# Patient Record
Sex: FEMALE | Race: WHITE | NOT HISPANIC OR LATINO | Employment: FULL TIME | URBAN - METROPOLITAN AREA
[De-identification: names, ages, dates, MRNs, and addresses within clinical notes are randomized per-mention and may not be internally consistent; named-entity substitution may affect disease eponyms.]

---

## 2017-11-08 ENCOUNTER — TRANSCRIBE ORDERS (OUTPATIENT)
Dept: NUTRITION | Facility: HOSPITAL | Age: 27
End: 2017-11-08

## 2017-11-08 DIAGNOSIS — E66.3 OVERWEIGHT: Primary | ICD-10-CM

## 2018-01-26 ENCOUNTER — OFFICE VISIT (OUTPATIENT)
Dept: INTERNAL MEDICINE | Facility: CLINIC | Age: 28
End: 2018-01-26

## 2018-01-26 VITALS
OXYGEN SATURATION: 99 % | HEIGHT: 67 IN | SYSTOLIC BLOOD PRESSURE: 110 MMHG | TEMPERATURE: 98.3 F | BODY MASS INDEX: 30.39 KG/M2 | HEART RATE: 65 BPM | DIASTOLIC BLOOD PRESSURE: 70 MMHG | WEIGHT: 193.6 LBS

## 2018-01-26 DIAGNOSIS — E78.2 MIXED HYPERLIPIDEMIA: Primary | ICD-10-CM

## 2018-01-26 PROCEDURE — 99203 OFFICE O/P NEW LOW 30 MIN: CPT | Performed by: NURSE PRACTITIONER

## 2018-01-26 RX ORDER — DROSPIRENONE AND ETHINYL ESTRADIOL 0.03MG-3MG
1 KIT ORAL DAILY
COMMUNITY
Start: 2017-11-03 | End: 2020-02-13 | Stop reason: ALTCHOICE

## 2018-01-26 NOTE — PROGRESS NOTES
Subjective   Stephanie Gupta is a 27 y.o. female here to establish care.  Chief Complaint   Patient presents with   • Establish Care     New Patient.  Discuss cholesterol results. Discuss family hx of afib       History of Present Illness     Hyperlipidemia- noted on labs a the [facility] 1/3/18.  Total cholesterol 247, triglycerides 149, HDL 19, .  She does have a strong family history of hyperlipidemia maternal and paternal sides.  Diet:  Was eating a lot of processed foods and fast food. Has started cutting back over the last couple of weeks. Watching portions. Eating more grains, eats gluten free.  Exercise: Not currently     She has a family history of atrial fibrillation in her mother.  She tells me that she has noted some palpitations that last 2-3 seconds and occur every few months over the last few years.  Last occurrence was month or so ago.  She denies any chest pain or  shortness of breath or irregular heartbeat..  She does drink caffeine on a daily basis.    Health maintenance:   Influenza: declines  Tdap: 2006, Td: declined   Pap: 10/2017 per GYN  Tobacco use: denies      The following portions of the patient's history were reviewed and updated as appropriate: allergies, current medications, past family history, past medical history, past social history, past surgical history and problem list.    Review of Systems   Constitutional: Negative for appetite change, chills, fatigue and fever.   HENT: Negative for congestion, ear pain, rhinorrhea, sinus pressure and sore throat.    Eyes: Negative for itching and visual disturbance.   Respiratory: Negative for cough, chest tightness, shortness of breath and wheezing.    Cardiovascular: Positive for palpitations. Negative for chest pain and leg swelling.   Gastrointestinal: Negative for abdominal pain, constipation, diarrhea, nausea and vomiting.   Endocrine: Negative for cold intolerance and heat intolerance.   Genitourinary: Negative for difficulty  "urinating, dysuria and hematuria.   Musculoskeletal: Negative for arthralgias, back pain, joint swelling and myalgias.   Skin: Negative for rash and wound.   Allergic/Immunologic: Negative for environmental allergies and food allergies.   Neurological: Negative for dizziness, weakness, light-headedness, numbness and headaches.   Hematological: Negative for adenopathy. Does not bruise/bleed easily.   Psychiatric/Behavioral: Negative for sleep disturbance. The patient is not nervous/anxious.      Blood pressure 110/70, pulse 65, temperature 98.3 °F (36.8 °C), height 168.9 cm (66.5\"), weight 87.8 kg (193 lb 9.6 oz), SpO2 99 %.    Allergies   Allergen Reactions   • Codeine Anaphylaxis     Past Medical History:   Diagnosis Date   • Pap smear for cervical cancer screening 10/2017    normal per pt report     Past Surgical History:   Procedure Laterality Date   • KNEE SURGERY Right 2005?    sports related trauma    • WISDOM TOOTH EXTRACTION  2004?     Family History   Problem Relation Age of Onset   • Atrial fibrillation Mother      pacemaker   • Heart disease Maternal Uncle    • Heart attack Maternal Grandfather 65   • No Known Problems Father    • Anxiety disorder Sister    • Subarachnoid hemorrhage Maternal Grandmother      ANTICOAGULATION AND FALL   • Lung cancer Paternal Grandfather      Social History     Social History   • Marital status: Single     Spouse name: N/A   • Number of children: N/A   • Years of education: N/A     Occupational History   •       Social History Main Topics   • Smoking status: Never Smoker   • Smokeless tobacco: Never Used   • Alcohol use 1.2 oz/week     2 Glasses of wine per week   • Drug use: No   • Sexual activity: Yes     Partners: Male     Birth control/ protection: OCP     Other Topics Concern   • Not on file     Social History Narrative    LIVES WITH BOYFRIEND     Immunization History   Administered Date(s) Administered   • Tdap 01/01/2006       Current Outpatient " Prescriptions:   •  OCELLA 3-0.03 MG per tablet, Take 1 tablet by mouth Daily., Disp: , Rfl:     Objective   Physical Exam   Constitutional: She is oriented to person, place, and time. She appears well-developed and well-nourished. No distress.   HENT:   Head: Normocephalic and atraumatic.   Eyes: EOM are normal. Pupils are equal, round, and reactive to light.   Neck: No JVD present.   Cardiovascular: Normal rate, regular rhythm, normal heart sounds and intact distal pulses.  PMI is not displaced.    No murmur heard.  Pulmonary/Chest: Effort normal and breath sounds normal. No respiratory distress. She exhibits no tenderness.   Abdominal: Soft. Bowel sounds are normal. She exhibits no distension. There is no tenderness.   Musculoskeletal: She exhibits no edema.   Neurological: She is alert and oriented to person, place, and time.   Skin: Skin is warm and dry. She is not diaphoretic. No erythema. No pallor.   Psychiatric: She has a normal mood and affect. Her behavior is normal.   Nursing note and vitals reviewed.      Assessment/Plan   Stephanie was seen today for establish care.    Diagnoses and all orders for this visit:    Mixed hyperlipidemia        Outpatient Encounter Prescriptions as of 1/26/2018   Medication Sig Dispense Refill   • OCELLA 3-0.03 MG per tablet Take 1 tablet by mouth Daily.       No facility-administered encounter medications on file as of 1/26/2018.        Decrease caffeine.  Report further palpitations and keep a log of symptoms associated with.  May need cardiac workup, but I suspect suspect that these are benign.  She will work on weight loss through diet, portion control, and exercise.  She is going to try to get her boyfriend to work out with her 3 days a week.  We'll plan to repeat labs in about 3 months.  Return in about 3 months (around 4/26/2018) for Annual physical, no pap, fasting labs .       Plan of care discussed with pt. They verbalized understanding and agreement.

## 2018-06-01 ENCOUNTER — OFFICE VISIT (OUTPATIENT)
Dept: INTERNAL MEDICINE | Facility: CLINIC | Age: 28
End: 2018-06-01

## 2018-06-01 VITALS
HEART RATE: 60 BPM | TEMPERATURE: 98.5 F | OXYGEN SATURATION: 99 % | HEIGHT: 67 IN | SYSTOLIC BLOOD PRESSURE: 110 MMHG | WEIGHT: 191.2 LBS | BODY MASS INDEX: 30.01 KG/M2 | DIASTOLIC BLOOD PRESSURE: 70 MMHG

## 2018-06-01 DIAGNOSIS — Z00.00 ANNUAL PHYSICAL EXAM: Primary | ICD-10-CM

## 2018-06-01 DIAGNOSIS — E78.5 DYSLIPIDEMIA: ICD-10-CM

## 2018-06-01 DIAGNOSIS — E53.8 B12 DEFICIENCY: ICD-10-CM

## 2018-06-01 DIAGNOSIS — Z13.29 THYROID DISORDER SCREEN: ICD-10-CM

## 2018-06-01 DIAGNOSIS — E55.9 VITAMIN D DEFICIENCY: ICD-10-CM

## 2018-06-01 DIAGNOSIS — Z13.220 LIPID SCREENING: ICD-10-CM

## 2018-06-01 LAB
BILIRUB BLD-MCNC: NEGATIVE MG/DL
CLARITY, POC: CLEAR
COLOR UR: YELLOW
GLUCOSE UR STRIP-MCNC: NEGATIVE MG/DL
KETONES UR QL: NEGATIVE
LEUKOCYTE EST, POC: NEGATIVE
NITRITE UR-MCNC: NEGATIVE MG/ML
PH UR: 6 [PH] (ref 5–8)
PROT UR STRIP-MCNC: NEGATIVE MG/DL
RBC # UR STRIP: NEGATIVE /UL
SP GR UR: 1.02 (ref 1–1.03)
UROBILINOGEN UR QL: NORMAL

## 2018-06-01 PROCEDURE — 99395 PREV VISIT EST AGE 18-39: CPT | Performed by: NURSE PRACTITIONER

## 2018-06-01 PROCEDURE — 81003 URINALYSIS AUTO W/O SCOPE: CPT | Performed by: NURSE PRACTITIONER

## 2018-06-01 NOTE — PROGRESS NOTES
Patient Care Team:  WILSON Espinoza as PCP - General (Nurse Practitioner)     Chief complaint: Patient is in today for a physical          Patient is a 28 y.o. female who presents for her yearly physical exam.     HPI      Does not do SBE    Followed by GYN    Hyperlipidemia- noted on labs a the [facility] 1/3/18.  Total cholesterol 247, triglycerides 149, HDL 19, .  She does have a strong family history of hyperlipidemia maternal and paternal sides.       Health maintenance:   Influenza: declines  Tdap: 2006, Td:          Pap: 10/2017 per GYN  Tobacco use: denies    Eye exam: 12/2015  Dental exam: every 6 months    Diet: trying to do lean meats, veggies, cut back on butter, saturated fats, eating gluten free.She does drink caffeine on a daily basis.    Exercise: does not do much, did just sign up for beach body on demand.   Has more energy and wants to be more active.       Review of Systems   Constitutional: Negative for appetite change, chills, fatigue and fever.   HENT: Negative for congestion, ear pain, rhinorrhea, sinus pressure and sore throat.    Eyes: Negative for itching and visual disturbance.   Respiratory: Negative for cough, shortness of breath and wheezing.    Cardiovascular: Negative for chest pain, palpitations and leg swelling.   Gastrointestinal: Negative for abdominal pain, constipation, diarrhea, nausea and vomiting.   Endocrine: Negative for cold intolerance and heat intolerance.   Genitourinary: Negative for difficulty urinating, dysuria and hematuria.   Musculoskeletal: Negative for arthralgias, back pain, joint swelling and myalgias.   Skin: Negative for rash and wound.   Allergic/Immunologic: Negative for environmental allergies and food allergies.   Neurological: Negative for dizziness, numbness and headaches.   Hematological: Negative for adenopathy. Does not bruise/bleed easily.   Psychiatric/Behavioral: Negative for dysphoric mood and sleep disturbance. The patient  "is not nervous/anxious.              History  Past Medical History:   Diagnosis Date   • Pap smear for cervical cancer screening 10/2017    normal per pt report      Past Surgical History:   Procedure Laterality Date   • KNEE SURGERY Right 2005?    sports related trauma    • WISDOM TOOTH EXTRACTION  2004?      Allergies   Allergen Reactions   • Codeine Anaphylaxis      Family History   Problem Relation Age of Onset   • Atrial fibrillation Mother         pacemaker   • Heart disease Maternal Uncle    • Heart attack Maternal Grandfather 65   • No Known Problems Father    • Anxiety disorder Sister    • Subarachnoid hemorrhage Maternal Grandmother         ANTICOAGULATION AND FALL   • Lung cancer Paternal Grandfather      Social History     Social History   • Marital status: Single     Spouse name: N/A   • Number of children: N/A   • Years of education: N/A     Occupational History   •       Social History Main Topics   • Smoking status: Never Smoker   • Smokeless tobacco: Never Used   • Alcohol use 1.2 oz/week     2 Glasses of wine per week   • Drug use: No   • Sexual activity: Yes     Partners: Male     Birth control/ protection: OCP     Other Topics Concern   • Not on file     Social History Narrative    LIVES WITH BOYFRIEND        Current Outpatient Prescriptions:   •  OCELLA 3-0.03 MG per tablet, Take 1 tablet by mouth Daily., Disp: , Rfl:    Immunization History   Administered Date(s) Administered   • Tdap 01/01/2006                   /70   Pulse 60   Temp 98.5 °F (36.9 °C) (Temporal Artery )   Ht 168.9 cm (66.5\")   Wt 86.7 kg (191 lb 3.2 oz)   LMP 05/14/2018 (Approximate)   SpO2 99%   BMI 30.40 kg/m²       Physical Exam   Constitutional: She is oriented to person, place, and time. She appears well-developed and well-nourished. No distress.   HENT:   Head: Normocephalic and atraumatic.   Right Ear: External ear normal.   Left Ear: External ear normal.   Nose: Nose normal.   Mouth/Throat: " Oropharynx is clear and moist.   Eyes: Conjunctivae and EOM are normal. Pupils are equal, round, and reactive to light. Right eye exhibits no discharge. Left eye exhibits no discharge. No scleral icterus.   Neck: Normal range of motion. Neck supple. No JVD present. Carotid bruit is not present. No tracheal deviation present. No thyromegaly present.   Cardiovascular: Normal rate, regular rhythm, normal heart sounds and intact distal pulses.  PMI is not displaced.  Exam reveals no gallop and no friction rub.    No murmur heard.  Pulmonary/Chest: Effort normal and breath sounds normal. No respiratory distress. She has no wheezes. She has no rales. She exhibits no tenderness. Right breast exhibits no inverted nipple, no mass, no nipple discharge, no skin change and no tenderness. Left breast exhibits no inverted nipple, no mass, no nipple discharge, no skin change and no tenderness. Breasts are symmetrical. There is no breast swelling.   Abdominal: Soft. Normal appearance and bowel sounds are normal. She exhibits no distension and no mass. There is no hepatosplenomegaly. There is no tenderness. No hernia.   Genitourinary: No breast tenderness, discharge or bleeding.   Musculoskeletal: Normal range of motion. She exhibits no edema, tenderness or deformity.   Lymphadenopathy:        Head (right side): No submental, no submandibular, no tonsillar, no preauricular, no posterior auricular and no occipital adenopathy present.        Head (left side): No submental, no submandibular, no tonsillar, no preauricular, no posterior auricular and no occipital adenopathy present.     She has no cervical adenopathy.     She has no axillary adenopathy.        Right axillary: No pectoral and no lateral adenopathy present.        Left axillary: No pectoral and no lateral adenopathy present.  Neurological: She is alert and oriented to person, place, and time. She has normal reflexes. She displays normal reflexes.   Skin: Skin is warm and  dry. No rash noted. She is not diaphoretic. No erythema. No pallor.   Psychiatric: She has a normal mood and affect. Her behavior is normal. Thought content normal.   Nursing note and vitals reviewed.                Diagnoses and all orders for this visit:    Annual physical exam  -     POCT urinalysis dipstick, automated  -     CBC (No Diff)  -     Lipid Panel  -     TSH  -     Vitamin B12  -     Comprehensive Metabolic Panel  -     Vitamin D 25 Hydroxy    Vitamin D deficiency  -     Vitamin D 25 Hydroxy    B12 deficiency  -     Vitamin B12    Thyroid disorder screen  -     TSH    Lipid screening  -     Lipid Panel    BMI 30.0-30.9,adult  -     Lipid Panel    Dyslipidemia  -     Lipid Panel         Labs sent  Immunizations and screenings are: UTD  Counseling: diet, exercise, SBE   Follow up: Return in about 1 year (around 6/1/2019) for Annual physical.      Yamile Celestin, WILSON   6/1/2018   12:04 PM

## 2018-06-02 DIAGNOSIS — E55.9 VITAMIN D DEFICIENCY: Primary | ICD-10-CM

## 2018-06-02 PROBLEM — E78.00 ELEVATED LDL CHOLESTEROL LEVEL: Status: ACTIVE | Noted: 2018-06-02

## 2018-06-02 LAB
25(OH)D3+25(OH)D2 SERPL-MCNC: 8.2 NG/ML
ALBUMIN SERPL-MCNC: 4.2 G/DL (ref 3.2–4.8)
ALBUMIN/GLOB SERPL: 1.6 G/DL (ref 1.5–2.5)
ALP SERPL-CCNC: 98 U/L (ref 25–100)
ALT SERPL-CCNC: 29 U/L (ref 7–40)
AST SERPL-CCNC: 21 U/L (ref 0–33)
BILIRUB SERPL-MCNC: 0.4 MG/DL (ref 0.3–1.2)
BUN SERPL-MCNC: 10 MG/DL (ref 9–23)
BUN/CREAT SERPL: 12.5 (ref 7–25)
CALCIUM SERPL-MCNC: 8.9 MG/DL (ref 8.7–10.4)
CHLORIDE SERPL-SCNC: 106 MMOL/L (ref 99–109)
CHOLEST SERPL-MCNC: 194 MG/DL (ref 0–200)
CO2 SERPL-SCNC: 26 MMOL/L (ref 20–31)
CREAT SERPL-MCNC: 0.8 MG/DL (ref 0.6–1.3)
ERYTHROCYTE [DISTWIDTH] IN BLOOD BY AUTOMATED COUNT: 12.5 % (ref 11.3–14.5)
GFR SERPLBLD CREATININE-BSD FMLA CKD-EPI: 102 ML/MIN/1.73
GFR SERPLBLD CREATININE-BSD FMLA CKD-EPI: 84 ML/MIN/1.73
GLOBULIN SER CALC-MCNC: 2.7 GM/DL
GLUCOSE SERPL-MCNC: 90 MG/DL (ref 70–100)
HCT VFR BLD AUTO: 38.8 % (ref 34.5–44)
HDLC SERPL-MCNC: 55 MG/DL (ref 40–60)
HGB BLD-MCNC: 13 G/DL (ref 11.5–15.5)
LDLC SERPL CALC-MCNC: 122 MG/DL (ref 0–100)
MCH RBC QN AUTO: 29.8 PG (ref 27–31)
MCHC RBC AUTO-ENTMCNC: 33.5 G/DL (ref 32–36)
MCV RBC AUTO: 89 FL (ref 80–99)
PLATELET # BLD AUTO: 278 10*3/MM3 (ref 150–450)
POTASSIUM SERPL-SCNC: 4 MMOL/L (ref 3.5–5.5)
PROT SERPL-MCNC: 6.9 G/DL (ref 5.7–8.2)
RBC # BLD AUTO: 4.36 10*6/MM3 (ref 3.89–5.14)
SODIUM SERPL-SCNC: 140 MMOL/L (ref 132–146)
TRIGL SERPL-MCNC: 83 MG/DL (ref 0–150)
TSH SERPL DL<=0.005 MIU/L-ACNC: 1.34 MIU/ML (ref 0.35–5.35)
VIT B12 SERPL-MCNC: 434 PG/ML (ref 211–911)
VLDLC SERPL CALC-MCNC: 16.6 MG/DL
WBC # BLD AUTO: 6.88 10*3/MM3 (ref 3.5–10.8)

## 2018-06-02 RX ORDER — CHOLECALCIFEROL (VITAMIN D3) 1250 MCG
50000 CAPSULE ORAL
Qty: 8 CAPSULE | Refills: 0 | Status: SHIPPED | OUTPATIENT
Start: 2018-06-02 | End: 2019-03-18

## 2018-06-04 ENCOUNTER — TELEPHONE (OUTPATIENT)
Dept: INTERNAL MEDICINE | Facility: CLINIC | Age: 28
End: 2018-06-04

## 2018-06-04 NOTE — TELEPHONE ENCOUNTER
----- Message from WILSON Espinoza sent at 6/2/2018  8:08 AM EDT -----  Please let her know her labs look good except her vit D is low. I will send in vit D3 50,000 units for her to take weekly X 8 weeks. After that she can take vit D 4000 units daily.   Also she has a slight elevation in her LDL (bad cholesterol) work on diet and exercise a bit more to help with this, no need for meds right now

## 2019-03-18 ENCOUNTER — OFFICE VISIT (OUTPATIENT)
Dept: INTERNAL MEDICINE | Facility: CLINIC | Age: 29
End: 2019-03-18

## 2019-03-18 VITALS
HEIGHT: 67 IN | RESPIRATION RATE: 16 BRPM | DIASTOLIC BLOOD PRESSURE: 84 MMHG | OXYGEN SATURATION: 99 % | WEIGHT: 198 LBS | TEMPERATURE: 97.9 F | BODY MASS INDEX: 31.08 KG/M2 | SYSTOLIC BLOOD PRESSURE: 122 MMHG | HEART RATE: 87 BPM

## 2019-03-18 DIAGNOSIS — R10.9 FLANK PAIN: Primary | ICD-10-CM

## 2019-03-18 DIAGNOSIS — M54.42 ACUTE BILATERAL LOW BACK PAIN WITH LEFT-SIDED SCIATICA: ICD-10-CM

## 2019-03-18 LAB
BILIRUB BLD-MCNC: NEGATIVE MG/DL
CLARITY, POC: CLEAR
COLOR UR: YELLOW
GLUCOSE UR STRIP-MCNC: NEGATIVE MG/DL
KETONES UR QL: NEGATIVE
LEUKOCYTE EST, POC: NEGATIVE
NITRITE UR-MCNC: NEGATIVE MG/ML
PH UR: 6 [PH] (ref 5–8)
PROT UR STRIP-MCNC: NEGATIVE MG/DL
RBC # UR STRIP: NEGATIVE /UL
SP GR UR: 1.01 (ref 1–1.03)
UROBILINOGEN UR QL: NORMAL

## 2019-03-18 PROCEDURE — 96372 THER/PROPH/DIAG INJ SC/IM: CPT | Performed by: NURSE PRACTITIONER

## 2019-03-18 PROCEDURE — 81003 URINALYSIS AUTO W/O SCOPE: CPT | Performed by: NURSE PRACTITIONER

## 2019-03-18 PROCEDURE — 99214 OFFICE O/P EST MOD 30 MIN: CPT | Performed by: NURSE PRACTITIONER

## 2019-03-18 RX ORDER — CYCLOBENZAPRINE HCL 5 MG
5 TABLET ORAL 3 TIMES DAILY PRN
Qty: 15 TABLET | Refills: 0 | Status: SHIPPED | OUTPATIENT
Start: 2019-03-18 | End: 2020-02-13

## 2019-03-18 RX ORDER — DICLOFENAC SODIUM 75 MG/1
75 TABLET, DELAYED RELEASE ORAL 2 TIMES DAILY PRN
Qty: 60 TABLET | Refills: 0 | Status: SHIPPED | OUTPATIENT
Start: 2019-03-18 | End: 2020-02-13

## 2019-03-18 RX ORDER — KETOROLAC TROMETHAMINE 30 MG/ML
60 INJECTION, SOLUTION INTRAMUSCULAR; INTRAVENOUS ONCE
Status: COMPLETED | OUTPATIENT
Start: 2019-03-18 | End: 2019-03-18

## 2019-03-18 RX ADMIN — KETOROLAC TROMETHAMINE 60 MG: 30 INJECTION, SOLUTION INTRAMUSCULAR; INTRAVENOUS at 14:58

## 2019-03-18 NOTE — PATIENT INSTRUCTIONS
Back Exercises  If you have pain in your back, do these exercises 2-3 times each day or as told by your doctor. When the pain goes away, do the exercises once each day, but repeat the steps more times for each exercise (do more repetitions). If you do not have pain in your back, do these exercises once each day or as told by your doctor.  Exercises  Single Knee to Chest  Do these steps 3-5 times in a row for each le. Lie on your back on a firm bed or the floor with your legs stretched out.  2. Bring one knee to your chest.  3. Hold your knee to your chest by grabbing your knee or thigh.  4. Pull on your knee until you feel a gentle stretch in your lower back.  5. Keep doing the stretch for 10-30 seconds.  6. Slowly let go of your leg and straighten it.    Pelvic Tilt  Do these steps 5-10 times in a row:  1. Lie on your back on a firm bed or the floor with your legs stretched out.  2. Bend your knees so they point up to the ceiling. Your feet should be flat on the floor.  3. Tighten your lower belly (abdomen) muscles to press your lower back against the floor. This will make your tailbone point up to the ceiling instead of pointing down to your feet or the floor.  4. Stay in this position for 5-10 seconds while you gently tighten your muscles and breathe evenly.    Cat-Cow    Do these steps until your lower back bends more easily:  1. Get on your hands and knees on a firm surface. Keep your hands under your shoulders, and keep your knees under your hips. You may put padding under your knees.  2. Let your head hang down, and make your tailbone point down to the floor so your lower back is round like the back of a cat.  3. Stay in this position for 5 seconds.  4. Slowly lift your head and make your tailbone point up to the ceiling so your back hangs low (sags) like the back of a cow.  5. Stay in this position for 5 seconds.    Press-Ups    Do these steps 5-10 times in a row:  1. Lie on your belly (face-down) on  the floor.  2. Place your hands near your head, about shoulder-width apart.  3. While you keep your back relaxed and keep your hips on the floor, slowly straighten your arms to raise the top half of your body and lift your shoulders. Do not use your back muscles. To make yourself more comfortable, you may change where you place your hands.  4. Stay in this position for 5 seconds.  5. Slowly return to lying flat on the floor.    Bridges    Do these steps 10 times in a row:  1. Lie on your back on a firm surface.  2. Bend your knees so they point up to the ceiling. Your feet should be flat on the floor.  3. Tighten your butt muscles and lift your butt off of the floor until your waist is almost as high as your knees. If you do not feel the muscles working in your butt and the back of your thighs, slide your feet 1-2 inches farther away from your butt.  4. Stay in this position for 3-5 seconds.  5. Slowly lower your butt to the floor, and let your butt muscles relax.    If this exercise is too easy, try doing it with your arms crossed over your chest.  Belly Crunches  Do these steps 5-10 times in a row:  1. Lie on your back on a firm bed or the floor with your legs stretched out.  2. Bend your knees so they point up to the ceiling. Your feet should be flat on the floor.  3. Cross your arms over your chest.  4. Tip your chin a little bit toward your chest but do not bend your neck.  5. Tighten your belly muscles and slowly raise your chest just enough to lift your shoulder blades a tiny bit off of the floor.  6. Slowly lower your chest and your head to the floor.    Back Lifts  Do these steps 5-10 times in a row:  1. Lie on your belly (face-down) with your arms at your sides, and rest your forehead on the floor.  2. Tighten the muscles in your legs and your butt.  3. Slowly lift your chest off of the floor while you keep your hips on the floor. Keep the back of your head in line with the curve in your back. Look at the  floor while you do this.  4. Stay in this position for 3-5 seconds.  5. Slowly lower your chest and your face to the floor.    Contact a doctor if:  · Your back pain gets a lot worse when you do an exercise.  · Your back pain does not lessen 2 hours after you exercise.  If you have any of these problems, stop doing the exercises. Do not do them again unless your doctor says it is okay.  Get help right away if:  · You have sudden, very bad back pain. If this happens, stop doing the exercises. Do not do them again unless your doctor says it is okay.  This information is not intended to replace advice given to you by your health care provider. Make sure you discuss any questions you have with your health care provider.  Document Released: 01/20/2012 Document Revised: 07/25/2018 Document Reviewed: 02/11/2016  Elsevier Interactive Patient Education © 2019 [employer]

## 2019-03-18 NOTE — PROGRESS NOTES
Subjective   Stephanie Gupta is a 29 y.o. female.     Chief Complaint   Patient presents with   • Back Pain     left low back pain radiates to back of leg 6 weeks.  Tried icy hot patches.         Back Pain   This is a new problem. The current episode started more than 1 month ago. The problem occurs intermittently. The problem has been waxing and waning since onset. The pain is present in the lumbar spine. The quality of the pain is described as aching and shooting. The pain radiates to the left thigh and left knee (radiates to left posterior thish and anterior LE. ). The pain is at a severity of 9/10. The pain is the same all the time. The symptoms are aggravated by bending, lying down, position, sitting, twisting and standing. Associated symptoms include leg pain. Pertinent negatives include no abdominal pain, bladder incontinence, bowel incontinence, chest pain, dysuria, headaches, numbness, paresis, paresthesias, pelvic pain, perianal numbness, tingling, weakness or weight loss. Risk factors include obesity. She has tried NSAIDs (acet, icy hot) for the symptoms. The treatment provided mild relief.        The following portions of the patient's history were reviewed and updated as appropriate: allergies, current medications, past family history, past medical history, past social history, past surgical history and problem list.    Review of Systems   Constitutional: Negative.  Negative for weight loss.   Respiratory: Negative.    Cardiovascular: Negative for chest pain.   Gastrointestinal: Negative.  Negative for abdominal pain and bowel incontinence.   Genitourinary: Negative.  Negative for bladder incontinence, dysuria and pelvic pain.   Musculoskeletal: Positive for back pain. Negative for arthralgias, gait problem and myalgias.   Neurological: Negative for dizziness, tingling, tremors, weakness, numbness, headaches and paresthesias.       Outpatient Medications Marked as Taking for the 3/18/19 encounter (Office  Visit) with Yamile Celestin APRN   Medication Sig Dispense Refill   • Cholecalciferol (VITAMIN D3) 5000 units capsule capsule Take 5,000 Units by mouth Daily.     • OCELLA 3-0.03 MG per tablet Take 1 tablet by mouth Daily.           Objective   Physical Exam   Constitutional: She is oriented to person, place, and time. She appears well-developed and well-nourished.   HENT:   Head: Normocephalic and atraumatic.   Eyes: Conjunctivae and EOM are normal. Pupils are equal, round, and reactive to light.   Neck: Normal range of motion.   Cardiovascular: Normal rate, regular rhythm and normal heart sounds.   Pulmonary/Chest: Effort normal and breath sounds normal.   Abdominal: Soft. Bowel sounds are normal.   Musculoskeletal:        Lumbar back: She exhibits decreased range of motion, tenderness and pain.   Neurological: She is alert and oriented to person, place, and time. She has normal strength and normal reflexes. No cranial nerve deficit or sensory deficit.   Skin: Skin is warm and dry.   Psychiatric: She has a normal mood and affect. Her behavior is normal. Judgment and thought content normal.   Nursing note and vitals reviewed.      Vitals:    03/18/19 1424   BP: 122/84   Pulse: 87   Resp: 16   Temp: 97.9 °F (36.6 °C)   SpO2: 99%     Brief Urine Lab Results  (Last result in the past 365 days)      Color   Clarity   Blood   Leuk Est   Nitrite   Protein   CREAT   Urine HCG        03/18/19 1442 Yellow Clear Negative Negative Negative Negative               Assessment/Plan   Stephanie was seen today for back pain.    Diagnoses and all orders for this visit:    Flank pain  -     POCT urinalysis dipstick, automated  -     ketorolac (TORADOL) injection 60 mg    Acute bilateral low back pain with left-sided sciatica  -     diclofenac (VOLTAREN) 75 MG EC tablet; Take 1 tablet by mouth 2 (Two) Times a Day As Needed (pain).  -     cyclobenzaprine (FLEXERIL) 5 MG tablet; Take 1 tablet by mouth 3 (Three) Times a Day As Needed for  Muscle Spasms.        toradol injection in office.   Diclofenac- start tomorrow.   Flexeril PRN  AE's discussed.    pt would like to avoid steroid and narcotic medications ifpossible.   Warm compress or heating pad to affected area, prn x 20 minutes with barrier between heat source and skin  Return if symptoms worsen or fail to improve.  Plan of care discussed with pt. They verbalized understanding and agreement.

## 2019-12-13 ENCOUNTER — TRANSCRIBE ORDERS (OUTPATIENT)
Dept: LAB | Facility: HOSPITAL | Age: 29
End: 2019-12-13

## 2019-12-13 ENCOUNTER — LAB (OUTPATIENT)
Dept: LAB | Facility: HOSPITAL | Age: 29
End: 2019-12-13

## 2019-12-13 DIAGNOSIS — Z01.419 ROUTINE GYNECOLOGICAL EXAMINATION: ICD-10-CM

## 2019-12-13 DIAGNOSIS — Z01.419 ROUTINE GYNECOLOGICAL EXAMINATION: Primary | ICD-10-CM

## 2019-12-13 LAB
25(OH)D3 SERPL-MCNC: 33.9 NG/ML (ref 30–100)
CHOLEST SERPL-MCNC: 192 MG/DL (ref 0–200)
DEPRECATED RDW RBC AUTO: 38.9 FL (ref 37–54)
ERYTHROCYTE [DISTWIDTH] IN BLOOD BY AUTOMATED COUNT: 11.8 % (ref 12.3–15.4)
GLUCOSE BLD-MCNC: 94 MG/DL (ref 65–99)
HCT VFR BLD AUTO: 37.3 % (ref 34–46.6)
HDLC SERPL-MCNC: 59 MG/DL (ref 40–60)
HGB BLD-MCNC: 12.9 G/DL (ref 12–15.9)
LDLC SERPL CALC-MCNC: 116 MG/DL (ref 0–100)
LDLC/HDLC SERPL: 1.97 {RATIO}
MCH RBC QN AUTO: 31.4 PG (ref 26.6–33)
MCHC RBC AUTO-ENTMCNC: 34.6 G/DL (ref 31.5–35.7)
MCV RBC AUTO: 90.8 FL (ref 79–97)
PLATELET # BLD AUTO: 303 10*3/MM3 (ref 140–450)
PMV BLD AUTO: 11.9 FL (ref 6–12)
RBC # BLD AUTO: 4.11 10*6/MM3 (ref 3.77–5.28)
TRIGL SERPL-MCNC: 84 MG/DL (ref 0–150)
TSH SERPL DL<=0.05 MIU/L-ACNC: 1.47 UIU/ML (ref 0.27–4.2)
VLDLC SERPL-MCNC: 16.8 MG/DL (ref 5–40)
WBC NRBC COR # BLD: 7.16 10*3/MM3 (ref 3.4–10.8)

## 2019-12-13 PROCEDURE — 36415 COLL VENOUS BLD VENIPUNCTURE: CPT

## 2019-12-13 PROCEDURE — 82947 ASSAY GLUCOSE BLOOD QUANT: CPT

## 2019-12-13 PROCEDURE — 85027 COMPLETE CBC AUTOMATED: CPT

## 2019-12-13 PROCEDURE — 84443 ASSAY THYROID STIM HORMONE: CPT

## 2019-12-13 PROCEDURE — 80061 LIPID PANEL: CPT

## 2019-12-13 PROCEDURE — 82306 VITAMIN D 25 HYDROXY: CPT

## 2020-02-13 ENCOUNTER — OFFICE VISIT (OUTPATIENT)
Dept: RETAIL CLINIC | Facility: CLINIC | Age: 30
End: 2020-02-13

## 2020-02-13 VITALS
DIASTOLIC BLOOD PRESSURE: 78 MMHG | WEIGHT: 202 LBS | RESPIRATION RATE: 16 BRPM | OXYGEN SATURATION: 98 % | HEART RATE: 114 BPM | TEMPERATURE: 98.5 F | HEIGHT: 67 IN | BODY MASS INDEX: 31.71 KG/M2 | SYSTOLIC BLOOD PRESSURE: 106 MMHG

## 2020-02-13 DIAGNOSIS — J10.1 INFLUENZA B: Primary | ICD-10-CM

## 2020-02-13 LAB
EXPIRATION DATE: ABNORMAL
FLUAV AG NPH QL: NEGATIVE
FLUBV AG NPH QL: POSITIVE
INTERNAL CONTROL: ABNORMAL
Lab: ABNORMAL

## 2020-02-13 PROCEDURE — 87804 INFLUENZA ASSAY W/OPTIC: CPT | Performed by: NURSE PRACTITIONER

## 2020-02-13 PROCEDURE — 99213 OFFICE O/P EST LOW 20 MIN: CPT | Performed by: NURSE PRACTITIONER

## 2020-02-13 RX ORDER — ETONOGESTREL AND ETHINYL ESTRADIOL 11.7; 2.7 MG/1; MG/1
1 INSERT, EXTENDED RELEASE VAGINAL
COMMUNITY
End: 2021-01-09 | Stop reason: HOSPADM

## 2020-02-13 RX ORDER — BROMPHENIRAMINE MALEATE, PSEUDOEPHEDRINE HYDROCHLORIDE, AND DEXTROMETHORPHAN HYDROBROMIDE 2; 30; 10 MG/5ML; MG/5ML; MG/5ML
5 SYRUP ORAL 4 TIMES DAILY PRN
Qty: 120 ML | Refills: 0 | Status: SHIPPED | OUTPATIENT
Start: 2020-02-13 | End: 2021-01-09 | Stop reason: HOSPADM

## 2020-02-13 RX ORDER — OSELTAMIVIR PHOSPHATE 75 MG/1
75 CAPSULE ORAL 2 TIMES DAILY
Qty: 10 CAPSULE | Refills: 0 | Status: SHIPPED | OUTPATIENT
Start: 2020-02-13 | End: 2020-02-18

## 2020-02-13 NOTE — PATIENT INSTRUCTIONS
"Influenza, Adult  Influenza is also called \"the flu.\" It is an infection in the lungs, nose, and throat (respiratory tract). It is caused by a virus. The flu causes symptoms that are similar to symptoms of a cold. It also causes a high fever and body aches.  The flu spreads easily from person to person (is contagious). Getting a flu shot (influenza vaccination) every year is the best way to prevent the flu.  What are the causes?  This condition is caused by the influenza virus. You can get the virus by:  · Breathing in droplets that are in the air from the cough or sneeze of a person who has the virus.  · Touching something that has the virus on it (is contaminated) and then touching your mouth, nose, or eyes.  What increases the risk?  Certain things may make you more likely to get the flu. These include:  · Not washing your hands often.  · Having close contact with many people during cold and flu season.  · Touching your mouth, eyes, or nose without first washing your hands.  · Not getting a flu shot every year.  You may have a higher risk for the flu, along with serious problems such as a lung infection (pneumonia), if you:  · Are older than 65.  · Are pregnant.  · Have a weakened disease-fighting system (immune system) because of a disease or taking certain medicines.  · Have a long-term (chronic) illness, such as:  ? Heart, kidney, or lung disease.  ? Diabetes.  ? Asthma.  · Have a liver disorder.  · Are very overweight (morbidly obese).  · Have anemia. This is a condition that affects your red blood cells.  What are the signs or symptoms?  Symptoms usually begin suddenly and last 4-14 days. They may include:  · Fever and chills.  · Headaches, body aches, or muscle aches.  · Sore throat.  · Cough.  · Runny or stuffy (congested) nose.  · Chest discomfort.  · Not wanting to eat as much as normal (poor appetite).  · Weakness or feeling tired (fatigue).  · Dizziness.  · Feeling sick to your stomach (nauseous) or " throwing up (vomiting).  How is this treated?  If the flu is found early, you can be treated with medicine that can help reduce how bad the illness is and how long it lasts (antiviral medicine). This may be given by mouth (orally) or through an IV tube.  Taking care of yourself at home can help your symptoms get better. Your doctor may suggest:  · Taking over-the-counter medicines.  · Drinking plenty of fluids.  The flu often goes away on its own. If you have very bad symptoms or other problems, you may be treated in a hospital.  Follow these instructions at home:         Activity  · Rest as needed. Get plenty of sleep.  · Stay home from work or school as told by your doctor.  ? Do not leave home until you do not have a fever for 24 hours without taking medicine.  ? Leave home only to visit your doctor.  Eating and drinking  · Take an ORS (oral rehydration solution). This is a drink that is sold at pharmacies and stores.  · Drink enough fluid to keep your pee (urine) pale yellow.  · Drink clear fluids in small amounts as you are able. Clear fluids include:  ? Water.  ? Ice chips.  ? Fruit juice that has water added (diluted fruit juice).  ? Low-calorie sports drinks.  · Eat bland, easy-to-digest foods in small amounts as you are able. These foods include:  ? Bananas.  ? Applesauce.  ? Rice.  ? Lean meats.  ? Toast.  ? Crackers.  · Do not eat or drink:  ? Fluids that have a lot of sugar or caffeine.  ? Alcohol.  ? Spicy or fatty foods.  General instructions  · Take over-the-counter and prescription medicines only as told by your doctor.  · Use a cool mist humidifier to add moisture to the air in your home. This can make it easier for you to breathe.  · Cover your mouth and nose when you cough or sneeze.  · Wash your hands with soap and water often, especially after you cough or sneeze. If you cannot use soap and water, use alcohol-based hand .  · Keep all follow-up visits as told by your doctor. This is  "important.  How is this prevented?    · Get a flu shot every year. You may get the flu shot in late summer, fall, or winter. Ask your doctor when you should get your flu shot.  · Avoid contact with people who are sick during fall and winter (cold and flu season).  Contact a doctor if:  · You get new symptoms.  · You have:  ? Chest pain.  ? Watery poop (diarrhea).  ? A fever.  · Your cough gets worse.  · You start to have more mucus.  · You feel sick to your stomach.  · You throw up.  Get help right away if you:  · Have shortness of breath.  · Have trouble breathing.  · Have skin or nails that turn a bluish color.  · Have very bad pain or stiffness in your neck.  · Get a sudden headache.  · Get sudden pain in your face or ear.  · Cannot eat or drink without throwing up.  Summary  · Influenza (\"the flu\") is an infection in the lungs, nose, and throat. It is caused by a virus.  · Take over-the-counter and prescription medicines only as told by your doctor.  · Getting a flu shot every year is the best way to avoid getting the flu.  This information is not intended to replace advice given to you by your health care provider. Make sure you discuss any questions you have with your health care provider.  Document Released: 09/26/2009 Document Revised: 06/05/2019 Document Reviewed: 06/05/2019  [employer] Patient Education © 2019 [employer]    "

## 2020-02-13 NOTE — PROGRESS NOTES
"Subjective   Stephanie Gupta is a 30 y.o. female.   Chief Complaint   Patient presents with   • Flu Symptoms      29 yo w/f presents with complaint of \"flu symptoms\" duration of 2 days, fever (102), runny nose alternating with  nasal congestion, occasional cough, body aches, chills, duration of 2 days.  She has taken therflu with some relief.  Refer to HPI/ROS for additional information.    Flu Symptoms   This is a new problem. Episode onset: 2 days. The problem has been gradually worsening. Associated symptoms include chills, congestion, coughing, a fever, headaches and myalgias. The symptoms are aggravated by exertion. She has tried rest (Theraflu) for the symptoms. The treatment provided mild relief.        The following portions of the patient's history were reviewed and updated as appropriate: allergies, current medications, past family history, past medical history, past social history, past surgical history and problem list.    Current Outpatient Medications:   •  etonogestrel-ethinyl estradiol (NUVARING) 0.12-0.015 MG/24HR vaginal ring, Insert 1 each into the vagina Every 28 (Twenty-Eight) Days. Insert vaginally and leave in place for 3 consecutive weeks, then remove for 1 week., Disp: , Rfl:   •  brompheniramine-pseudoephedrine-DM 30-2-10 MG/5ML syrup, Take 5 mL by mouth 4 (Four) Times a Day As Needed for Congestion or Cough., Disp: 120 mL, Rfl: 0  •  oseltamivir (TAMIFLU) 75 MG capsule, Take 1 capsule by mouth 2 (Two) Times a Day for 5 days., Disp: 10 capsule, Rfl: 0    Review of Systems   Constitutional: Positive for activity change, appetite change, chills and fever.   HENT: Positive for congestion, postnasal drip, rhinorrhea and sneezing. Negative for ear discharge, ear pain and trouble swallowing.    Eyes: Negative.    Respiratory: Positive for cough. Negative for apnea, choking, chest tightness, shortness of breath, wheezing and stridor.    Cardiovascular: Negative.    Gastrointestinal: Negative.  " "  Musculoskeletal: Positive for myalgias.   Skin: Negative.    Neurological: Positive for headaches.   Psychiatric/Behavioral: Negative.      /78 (BP Location: Left arm, Patient Position: Sitting)   Pulse 114   Temp 98.5 °F (36.9 °C) (Oral)   Resp 16   Ht 170.2 cm (67\")   Wt 91.6 kg (202 lb)   SpO2 98%   BMI 31.64 kg/m²     Objective   Allergies   Allergen Reactions   • Codeine Anaphylaxis       Physical Exam   Constitutional: She is oriented to person, place, and time. Vital signs are normal. She appears well-developed and well-nourished. She is active and cooperative. She appears ill. No distress.   HENT:   Head: Normocephalic.   Right Ear: Hearing, tympanic membrane, external ear and ear canal normal.   Left Ear: Hearing, tympanic membrane, external ear and ear canal normal.   Nose: Mucosal edema and rhinorrhea present. No sinus tenderness. Right sinus exhibits no maxillary sinus tenderness and no frontal sinus tenderness. Left sinus exhibits no maxillary sinus tenderness and no frontal sinus tenderness.   Mouth/Throat: Uvula is midline and mucous membranes are normal. No oropharyngeal exudate. Tonsils are 0 on the right. Tonsils are 0 on the left. No tonsillar exudate.   Postnasal drip noted   Eyes: Conjunctivae are normal.   Neck: Normal range of motion. Neck supple. No JVD present. No tracheal deviation present. No thyromegaly present.   Cardiovascular: Normal rate, regular rhythm and normal heart sounds. Exam reveals no gallop and no friction rub.   No murmur heard.  Pulmonary/Chest: Effort normal and breath sounds normal. No stridor. No respiratory distress. She has no wheezes. She has no rales.   Neurological: She is alert and oriented to person, place, and time.   Skin: Skin is warm and dry. Capillary refill takes less than 2 seconds. She is not diaphoretic.   Psychiatric: She has a normal mood and affect. Her behavior is normal.   Vitals reviewed.      Assessment/Plan   Stephanie was seen today " for flu symptoms.    Diagnoses and all orders for this visit:    Influenza B  -     POC Influenza A / B    Other orders  -     oseltamivir (TAMIFLU) 75 MG capsule; Take 1 capsule by mouth 2 (Two) Times a Day for 5 days.  -     brompheniramine-pseudoephedrine-DM 30-2-10 MG/5ML syrup; Take 5 mL by mouth 4 (Four) Times a Day As Needed for Congestion or Cough.      Results for orders placed or performed in visit on 02/13/20   POC Influenza A / B   Result Value Ref Range    Rapid Influenza A Ag Negative Negative    Rapid Influenza B Ag Positive (A) Negative    Internal Control Passed Passed    Lot Number [id number]     Expiration Date 05/06/2022           An After Visit Summary was printed, reviewed, and given to the patient. Understanding verbalized and agrees with treatment plan.  If no improvement or becomes worse, follow up with primary or go to [facility]/ER.          February 13, 2020 10:02 AM

## 2020-06-02 ENCOUNTER — LAB (OUTPATIENT)
Dept: LAB | Facility: HOSPITAL | Age: 30
End: 2020-06-02

## 2020-06-02 ENCOUNTER — TRANSCRIBE ORDERS (OUTPATIENT)
Dept: LAB | Facility: HOSPITAL | Age: 30
End: 2020-06-02

## 2020-06-02 DIAGNOSIS — Z3A.08 8 WEEKS GESTATION OF PREGNANCY: Primary | ICD-10-CM

## 2020-06-02 DIAGNOSIS — Z3A.08 8 WEEKS GESTATION OF PREGNANCY: ICD-10-CM

## 2020-06-02 DIAGNOSIS — Z34.81 PRENATAL CARE, SUBSEQUENT PREGNANCY, FIRST TRIMESTER: ICD-10-CM

## 2020-06-02 LAB
ABO GROUP BLD: NORMAL
AMPHET+METHAMPHET UR QL: NEGATIVE
AMPHETAMINES UR QL: NEGATIVE
BARBITURATES UR QL SCN: NEGATIVE
BASOPHILS # BLD AUTO: 0.04 10*3/MM3 (ref 0–0.2)
BASOPHILS NFR BLD AUTO: 0.5 % (ref 0–1.5)
BENZODIAZ UR QL SCN: NEGATIVE
BILIRUB UR QL STRIP: NEGATIVE
BLD GP AB SCN SERPL QL: NEGATIVE
BUPRENORPHINE SERPL-MCNC: NEGATIVE NG/ML
CANNABINOIDS SERPL QL: NEGATIVE
CLARITY UR: CLEAR
COCAINE UR QL: NEGATIVE
COLOR UR: YELLOW
DEPRECATED RDW RBC AUTO: 41.2 FL (ref 37–54)
EOSINOPHIL # BLD AUTO: 0.02 10*3/MM3 (ref 0–0.4)
EOSINOPHIL NFR BLD AUTO: 0.2 % (ref 0.3–6.2)
ERYTHROCYTE [DISTWIDTH] IN BLOOD BY AUTOMATED COUNT: 12.6 % (ref 12.3–15.4)
GLUCOSE BLD-MCNC: 84 MG/DL (ref 65–99)
GLUCOSE UR STRIP-MCNC: NEGATIVE MG/DL
HBV SURFACE AG SERPL QL IA: NORMAL
HCT VFR BLD AUTO: 38 % (ref 34–46.6)
HCV AB SER DONR QL: NORMAL
HGB BLD-MCNC: 13.3 G/DL (ref 12–15.9)
HGB UR QL STRIP.AUTO: NEGATIVE
HIV1+2 AB SER QL: NORMAL
IMM GRANULOCYTES # BLD AUTO: 0.04 10*3/MM3 (ref 0–0.05)
IMM GRANULOCYTES NFR BLD AUTO: 0.5 % (ref 0–0.5)
KETONES UR QL STRIP: NEGATIVE
LEUKOCYTE ESTERASE UR QL STRIP.AUTO: NEGATIVE
LYMPHOCYTES # BLD AUTO: 1.92 10*3/MM3 (ref 0.7–3.1)
LYMPHOCYTES NFR BLD AUTO: 23 % (ref 19.6–45.3)
MCH RBC QN AUTO: 31.4 PG (ref 26.6–33)
MCHC RBC AUTO-ENTMCNC: 35 G/DL (ref 31.5–35.7)
MCV RBC AUTO: 89.8 FL (ref 79–97)
METHADONE UR QL SCN: NEGATIVE
MONOCYTES # BLD AUTO: 0.5 10*3/MM3 (ref 0.1–0.9)
MONOCYTES NFR BLD AUTO: 6 % (ref 5–12)
NEUTROPHILS # BLD AUTO: 5.83 10*3/MM3 (ref 1.7–7)
NEUTROPHILS NFR BLD AUTO: 69.8 % (ref 42.7–76)
NITRITE UR QL STRIP: NEGATIVE
NRBC BLD AUTO-RTO: 0 /100 WBC (ref 0–0.2)
OPIATES UR QL: NEGATIVE
OXYCODONE UR QL SCN: NEGATIVE
PCP UR QL SCN: NEGATIVE
PH UR STRIP.AUTO: 7 [PH] (ref 5–8)
PLATELET # BLD AUTO: 268 10*3/MM3 (ref 140–450)
PMV BLD AUTO: 12.7 FL (ref 6–12)
PROPOXYPH UR QL: NEGATIVE
PROT UR QL STRIP: NEGATIVE
RBC # BLD AUTO: 4.23 10*6/MM3 (ref 3.77–5.28)
RH BLD: POSITIVE
SP GR UR STRIP: 1.01 (ref 1–1.03)
TRICYCLICS UR QL SCN: NEGATIVE
UROBILINOGEN UR QL STRIP: NORMAL
WBC NRBC COR # BLD: 8.35 10*3/MM3 (ref 3.4–10.8)

## 2020-06-02 PROCEDURE — 86803 HEPATITIS C AB TEST: CPT

## 2020-06-02 PROCEDURE — 82947 ASSAY GLUCOSE BLOOD QUANT: CPT

## 2020-06-02 PROCEDURE — 80081 OBSTETRIC PANEL INC HIV TSTG: CPT

## 2020-06-02 PROCEDURE — 36415 COLL VENOUS BLD VENIPUNCTURE: CPT

## 2020-06-02 PROCEDURE — 80306 DRUG TEST PRSMV INSTRMNT: CPT

## 2020-06-02 PROCEDURE — 81003 URINALYSIS AUTO W/O SCOPE: CPT

## 2020-06-03 LAB
HOLD SPECIMEN: NORMAL
RPR SER QL: NORMAL
RUBV IGG SERPL IA-ACNC: NEGATIVE

## 2020-10-23 ENCOUNTER — LAB (OUTPATIENT)
Dept: LAB | Facility: HOSPITAL | Age: 30
End: 2020-10-23

## 2020-10-23 ENCOUNTER — TRANSCRIBE ORDERS (OUTPATIENT)
Dept: LAB | Facility: HOSPITAL | Age: 30
End: 2020-10-23

## 2020-10-23 DIAGNOSIS — Z3A.28 28 WEEKS GESTATION OF PREGNANCY: Primary | ICD-10-CM

## 2020-10-23 DIAGNOSIS — Z34.83 PRENATAL CARE, SUBSEQUENT PREGNANCY, THIRD TRIMESTER: ICD-10-CM

## 2020-10-23 DIAGNOSIS — Z3A.28 28 WEEKS GESTATION OF PREGNANCY: ICD-10-CM

## 2020-10-23 LAB
BLD GP AB SCN SERPL QL: NEGATIVE
DEPRECATED RDW RBC AUTO: 40.5 FL (ref 37–54)
ERYTHROCYTE [DISTWIDTH] IN BLOOD BY AUTOMATED COUNT: 12 % (ref 12.3–15.4)
EXTERNAL GTT 1 HOUR: 158
GLUCOSE 1H P 100 G GLC PO SERPL-MCNC: 158 MG/DL (ref 65–140)
HCT VFR BLD AUTO: 37.8 % (ref 34–46.6)
HGB BLD-MCNC: 12.7 G/DL (ref 12–15.9)
MCH RBC QN AUTO: 30.6 PG (ref 26.6–33)
MCHC RBC AUTO-ENTMCNC: 33.6 G/DL (ref 31.5–35.7)
MCV RBC AUTO: 91.1 FL (ref 79–97)
PLATELET # BLD AUTO: 225 10*3/MM3 (ref 140–450)
PMV BLD AUTO: 12.9 FL (ref 6–12)
RBC # BLD AUTO: 4.15 10*6/MM3 (ref 3.77–5.28)
WBC # BLD AUTO: 9.62 10*3/MM3 (ref 3.4–10.8)

## 2020-10-23 PROCEDURE — 36415 COLL VENOUS BLD VENIPUNCTURE: CPT

## 2020-10-23 PROCEDURE — 82950 GLUCOSE TEST: CPT

## 2020-10-23 PROCEDURE — 85027 COMPLETE CBC AUTOMATED: CPT

## 2020-10-23 PROCEDURE — 86850 RBC ANTIBODY SCREEN: CPT

## 2020-10-26 ENCOUNTER — LAB (OUTPATIENT)
Dept: LAB | Facility: HOSPITAL | Age: 30
End: 2020-10-26

## 2020-10-26 ENCOUNTER — TRANSCRIBE ORDERS (OUTPATIENT)
Dept: LAB | Facility: HOSPITAL | Age: 30
End: 2020-10-26

## 2020-10-26 DIAGNOSIS — Z34.83 PRENATAL CARE, SUBSEQUENT PREGNANCY, THIRD TRIMESTER: Primary | ICD-10-CM

## 2020-10-26 DIAGNOSIS — Z34.83 PRENATAL CARE, SUBSEQUENT PREGNANCY, THIRD TRIMESTER: ICD-10-CM

## 2020-10-26 LAB
EXTERNAL GTT 3 HOURS: NORMAL
GLUCOSE 1H P 100 G GLC PO SERPL-MCNC: 167 MG/DL (ref 74–180)
GLUCOSE 2H P 100 G GLC PO SERPL-MCNC: 152 MG/DL (ref 74–155)
GLUCOSE 3H P 100 G GLC PO SERPL-MCNC: 130 MG/DL (ref 74–140)
GLUCOSE P FAST SERPL-MCNC: 80 MG/DL (ref 74–106)

## 2020-10-26 PROCEDURE — 82952 GTT-ADDED SAMPLES: CPT

## 2020-10-26 PROCEDURE — 82951 GLUCOSE TOLERANCE TEST (GTT): CPT

## 2020-10-26 PROCEDURE — 36415 COLL VENOUS BLD VENIPUNCTURE: CPT

## 2020-12-10 LAB — EXTERNAL GROUP B STREP ANTIGEN: NEGATIVE

## 2021-01-06 ENCOUNTER — HOSPITAL ENCOUNTER (INPATIENT)
Facility: HOSPITAL | Age: 31
LOS: 3 days | Discharge: HOME | End: 2021-01-09
Attending: OBSTETRICS & GYNECOLOGY | Admitting: OBSTETRICS & GYNECOLOGY

## 2021-01-06 DIAGNOSIS — E66.01 MORBIDLY OBESE (HCC): ICD-10-CM

## 2021-01-06 PROBLEM — Z3A.40 40 WEEKS GESTATION OF PREGNANCY: Status: ACTIVE | Noted: 2021-01-06

## 2021-01-06 LAB
ABO GROUP BLD: NORMAL
ALP SERPL-CCNC: 198 U/L (ref 39–117)
ALT SERPL W P-5'-P-CCNC: 12 U/L (ref 1–33)
AST SERPL-CCNC: 16 U/L (ref 1–32)
BILIRUB SERPL-MCNC: 0.2 MG/DL (ref 0–1.2)
BLD GP AB SCN SERPL QL: NEGATIVE
CREAT SERPL-MCNC: 0.49 MG/DL (ref 0.57–1)
DEPRECATED RDW RBC AUTO: 44.6 FL (ref 37–54)
ERYTHROCYTE [DISTWIDTH] IN BLOOD BY AUTOMATED COUNT: 13.2 % (ref 12.3–15.4)
HCT VFR BLD AUTO: 36.2 % (ref 34–46.6)
HGB BLD-MCNC: 12 G/DL (ref 12–15.9)
LDH SERPL-CCNC: 303 U/L (ref 135–214)
MCH RBC QN AUTO: 30.5 PG (ref 26.6–33)
MCHC RBC AUTO-ENTMCNC: 33.1 G/DL (ref 31.5–35.7)
MCV RBC AUTO: 91.9 FL (ref 79–97)
PLATELET # BLD AUTO: 190 10*3/MM3 (ref 140–450)
PMV BLD AUTO: 13.1 FL (ref 6–12)
RBC # BLD AUTO: 3.94 10*6/MM3 (ref 3.77–5.28)
RH BLD: POSITIVE
SARS-COV-2 RDRP RESP QL NAA+PROBE: NORMAL
T&S EXPIRATION DATE: NORMAL
URATE SERPL-MCNC: 3.7 MG/DL (ref 2.4–5.7)
WBC # BLD AUTO: 8.01 10*3/MM3 (ref 3.4–10.8)

## 2021-01-06 PROCEDURE — 82247 BILIRUBIN TOTAL: CPT | Performed by: ADVANCED PRACTICE MIDWIFE

## 2021-01-06 PROCEDURE — 86850 RBC ANTIBODY SCREEN: CPT | Performed by: ADVANCED PRACTICE MIDWIFE

## 2021-01-06 PROCEDURE — 84075 ASSAY ALKALINE PHOSPHATASE: CPT | Performed by: ADVANCED PRACTICE MIDWIFE

## 2021-01-06 PROCEDURE — 84450 TRANSFERASE (AST) (SGOT): CPT | Performed by: ADVANCED PRACTICE MIDWIFE

## 2021-01-06 PROCEDURE — 82565 ASSAY OF CREATININE: CPT | Performed by: ADVANCED PRACTICE MIDWIFE

## 2021-01-06 PROCEDURE — 84460 ALANINE AMINO (ALT) (SGPT): CPT | Performed by: ADVANCED PRACTICE MIDWIFE

## 2021-01-06 PROCEDURE — 86900 BLOOD TYPING SEROLOGIC ABO: CPT | Performed by: ADVANCED PRACTICE MIDWIFE

## 2021-01-06 PROCEDURE — 83615 LACTATE (LD) (LDH) ENZYME: CPT | Performed by: ADVANCED PRACTICE MIDWIFE

## 2021-01-06 PROCEDURE — 87635 SARS-COV-2 COVID-19 AMP PRB: CPT | Performed by: ADVANCED PRACTICE MIDWIFE

## 2021-01-06 PROCEDURE — 84550 ASSAY OF BLOOD/URIC ACID: CPT | Performed by: ADVANCED PRACTICE MIDWIFE

## 2021-01-06 PROCEDURE — 86901 BLOOD TYPING SEROLOGIC RH(D): CPT | Performed by: ADVANCED PRACTICE MIDWIFE

## 2021-01-06 PROCEDURE — 85027 COMPLETE CBC AUTOMATED: CPT | Performed by: ADVANCED PRACTICE MIDWIFE

## 2021-01-06 RX ORDER — FAMOTIDINE 20 MG/1
20 TABLET, FILM COATED ORAL EVERY 12 HOURS SCHEDULED
Status: DISCONTINUED | OUTPATIENT
Start: 2021-01-06 | End: 2021-01-07 | Stop reason: HOSPADM

## 2021-01-06 RX ORDER — BUTORPHANOL TARTRATE 1 MG/ML
1 INJECTION, SOLUTION INTRAMUSCULAR; INTRAVENOUS
Status: DISCONTINUED | OUTPATIENT
Start: 2021-01-06 | End: 2021-01-07 | Stop reason: HOSPADM

## 2021-01-06 RX ORDER — LIDOCAINE HYDROCHLORIDE 10 MG/ML
5 INJECTION, SOLUTION EPIDURAL; INFILTRATION; INTRACAUDAL; PERINEURAL AS NEEDED
Status: DISCONTINUED | OUTPATIENT
Start: 2021-01-06 | End: 2021-01-07 | Stop reason: HOSPADM

## 2021-01-06 RX ORDER — PRENATAL WITH FERROUS FUM AND FOLIC ACID 3080; 920; 120; 400; 22; 1.84; 3; 20; 10; 1; 12; 200; 27; 25; 2 [IU]/1; [IU]/1; MG/1; [IU]/1; MG/1; MG/1; MG/1; MG/1; MG/1; MG/1; UG/1; MG/1; MG/1; MG/1; MG/1
1 TABLET ORAL DAILY
COMMUNITY
End: 2022-05-27

## 2021-01-06 RX ORDER — FAMOTIDINE 10 MG/ML
20 INJECTION, SOLUTION INTRAVENOUS EVERY 12 HOURS SCHEDULED
Status: DISCONTINUED | OUTPATIENT
Start: 2021-01-06 | End: 2021-01-07 | Stop reason: HOSPADM

## 2021-01-06 RX ORDER — OXYTOCIN-SODIUM CHLORIDE 0.9% IV SOLN 30 UNIT/500ML 30-0.9/5 UT/ML-%
2-20 SOLUTION INTRAVENOUS
Status: DISCONTINUED | OUTPATIENT
Start: 2021-01-06 | End: 2021-01-07 | Stop reason: HOSPADM

## 2021-01-06 RX ORDER — MAGNESIUM CARB/ALUMINUM HYDROX 105-160MG
30 TABLET,CHEWABLE ORAL ONCE
Status: DISCONTINUED | OUTPATIENT
Start: 2021-01-06 | End: 2021-01-07 | Stop reason: HOSPADM

## 2021-01-06 RX ORDER — ONDANSETRON 4 MG/1
4 TABLET, FILM COATED ORAL EVERY 6 HOURS PRN
Status: DISCONTINUED | OUTPATIENT
Start: 2021-01-06 | End: 2021-01-07 | Stop reason: HOSPADM

## 2021-01-06 RX ORDER — ONDANSETRON 2 MG/ML
4 INJECTION INTRAMUSCULAR; INTRAVENOUS EVERY 6 HOURS PRN
Status: DISCONTINUED | OUTPATIENT
Start: 2021-01-06 | End: 2021-01-07 | Stop reason: HOSPADM

## 2021-01-06 RX ORDER — SODIUM CHLORIDE 0.9 % (FLUSH) 0.9 %
3 SYRINGE (ML) INJECTION EVERY 12 HOURS SCHEDULED
Status: DISCONTINUED | OUTPATIENT
Start: 2021-01-06 | End: 2021-01-07 | Stop reason: HOSPADM

## 2021-01-06 RX ORDER — SODIUM CHLORIDE 0.9 % (FLUSH) 0.9 %
10 SYRINGE (ML) INJECTION AS NEEDED
Status: DISCONTINUED | OUTPATIENT
Start: 2021-01-06 | End: 2021-01-07 | Stop reason: HOSPADM

## 2021-01-06 RX ORDER — SODIUM CHLORIDE, SODIUM LACTATE, POTASSIUM CHLORIDE, CALCIUM CHLORIDE 600; 310; 30; 20 MG/100ML; MG/100ML; MG/100ML; MG/100ML
125 INJECTION, SOLUTION INTRAVENOUS CONTINUOUS
Status: DISCONTINUED | OUTPATIENT
Start: 2021-01-06 | End: 2021-01-07

## 2021-01-06 RX ADMIN — FAMOTIDINE 20 MG: 10 INJECTION, SOLUTION INTRAVENOUS at 22:00

## 2021-01-06 RX ADMIN — SODIUM CHLORIDE, POTASSIUM CHLORIDE, SODIUM LACTATE AND CALCIUM CHLORIDE 125 ML/HR: 600; 310; 30; 20 INJECTION, SOLUTION INTRAVENOUS at 14:00

## 2021-01-06 RX ADMIN — OXYTOCIN 2 MILLI-UNITS/MIN: 10 INJECTION INTRAVENOUS at 16:20

## 2021-01-06 RX ADMIN — SODIUM CHLORIDE, POTASSIUM CHLORIDE, SODIUM LACTATE AND CALCIUM CHLORIDE 125 ML/HR: 600; 310; 30; 20 INJECTION, SOLUTION INTRAVENOUS at 21:07

## 2021-01-06 NOTE — H&P
[facility]  Obstetric History and Physical    Induction      Subjective     Patient is a 30 y.o. female  currently at 40 weeks gestation, who presents for induction of labor  for oligohydramnios with SMITH 3.3  with favorable cervix. She voices good fetal movement. She denies vaginal bleeding and leaking of fluid. She plans an epidural for labor and birth.    Her prenatal care is benign.  Her previous obstetric/gynecological history is non-contributory.    The following portions of the patients history were reviewed and updated as appropriate: current medications, allergies, past medical history, past surgical history, past family history, past social history and problem list .       Prenatal Information:   Maternal Prenatal Labs  Blood Type No results found for: ABO   Rh Status No results found for: RH   Antibody Screen No results found for: ABSCRN   Gonnorhea No results found for: GCCX   Chlamydia No results found for: CLAMYDCU   RPR No results found for: RPR   Syphilis Antibody No results found for: SYPHILIS   Rubella No results found for: RUBELLAIGGIN   Hepatitis B Surface Antigen No results found for: HEPBSAG   HIV-1 Antibody No results found for: LABHIV1   Hepatitis C Antibody No results found for: HEPCAB   Rapid [place] Drug Screen No results found for: AMPMETHU, BARBITSCNUR, LABBENZSCN, LABMETHSCN, LABOPIASCN, THCURSCR, COCAINEUR, AMPHETSCREEN, PROPOXSCN, BUPRENORSCNU, METAMPSCNUR, OXYCODONESCN, TRICYCLICSCN   Group B Strep Culture Negative                 Past OB History:       OB History    Para Term  AB Living   1 0 0 0 0 0   SAB TAB Ectopic Molar Multiple Live Births   0 0 0 0 0 0      # Outcome Date GA Lbr Artemio/2nd Weight Sex Delivery Anes PTL Lv   1 Current                Past Medical History: Past Medical History:   Diagnosis Date   • Pap smear for cervical cancer screening 10/2017    normal per pt report      Past Surgical History Past Surgical History:   Procedure Laterality Date   •  KNEE SURGERY Right 2005?    sports related trauma    • WISDOM TOOTH EXTRACTION  2004?      Family History: Family History   Problem Relation Age of Onset   • Atrial fibrillation Mother         pacemaker   • Heart disease Maternal Uncle    • Heart attack Maternal Grandfather 65   • No Known Problems Father    • Anxiety disorder Sister    • Subarachnoid hemorrhage Maternal Grandmother         ANTICOAGULATION AND FALL   • Lung cancer Paternal Grandfather       Social History:  reports that she has never smoked. She has never used smokeless tobacco.   reports current alcohol use of about 2.0 standard drinks of alcohol per week.   reports no history of drug use.        REVIEW OF SYSTEMS             Reports fetal movement is normal             Denies leakage of amniotic fluid.             Denies vaginal bleeding             She reports Occasional contractions, intensity mild             All other systems reviewed and are negative    Objective       Vital Signs Range for the last 24 hours  Temperature:     Temp Source:     BP:     Pulse:     Respirations:     SPO2:     O2 Amount (l/min):     O2 Devices     Weight:         Presentation: vertex   Cervix: Exam by:  SG Greco CNM   Dilation:  5   Effacement:  75   Station:  -2       Fetal Heart Rate Assessment   Method:     Beats/min:     Baseline:     Varibility:     Accels:     Decels:     Tracing Category:      NST: REACTIVE     Uterine Assessment   Method:     Frequency (min):     Ctx Count in 10 min:     Duration:     Intensity:     Intensity by IUPC:     Resting Tone:     Resting Tone by IUPC:     [place] Units:             Constitutional:  Well developed, well nourished, no acute distress, well-groomed.   Respiratory:  Resp e/e/u, normal breath sounds.   Cardiovascular:  Normal rate and rhythm, no murmurs.   Gastrointestinal:  Soft, gravid, nontender.  Uterus: Soft, nontender. Fundus appropriate for dates.  Neurologic:  Alert & oriented x 3,  no focal deficits noted.    Psychiatric:  Speech and behavior appropriate.   Extremities: no cyanosis, clubbing or edema, no evidence of DVT.        Labs:  Lab Results   Component Value Date    WBC 9.62 10/23/2020    HGB 12.7 10/23/2020    HCT 37.8 10/23/2020    MCV 91.1 10/23/2020     10/23/2020    GLU 90 06/01/2018    AST 21 06/01/2018    ALT 29 06/01/2018               Assessment/Plan       40 weeks gestation of pregnancy        Assessment:  1.  Intrauterine pregnancy at Unknown weeks gestation with reactive fetal status.    2.   induction of labor  for oligohydramnios with SMITH 3.3  with favorable cervix  3.  Obstetrical history is non-contributory.  4.  GBS status: Negative    Plan:  1.Oxytocin induction  2. Plan of care has been reviewed with patient and questions answered.  3.  Risks, benefits of treatment plan have been discussed.  4.  All questions have been answered.        Lindsey Greco CNM  1/6/2021  12:48 EST

## 2021-01-07 ENCOUNTER — ANESTHESIA (OUTPATIENT)
Dept: LABOR AND DELIVERY | Facility: HOSPITAL | Age: 31
End: 2021-01-07

## 2021-01-07 ENCOUNTER — ANESTHESIA EVENT (OUTPATIENT)
Dept: LABOR AND DELIVERY | Facility: HOSPITAL | Age: 31
End: 2021-01-07

## 2021-01-07 PROBLEM — E66.01 MORBIDLY OBESE: Status: ACTIVE | Noted: 2021-01-07

## 2021-01-07 PROBLEM — Z3A.40 40 WEEKS GESTATION OF PREGNANCY: Status: RESOLVED | Noted: 2021-01-06 | Resolved: 2021-01-07

## 2021-01-07 PROCEDURE — 51703 INSERT BLADDER CATH COMPLEX: CPT

## 2021-01-07 PROCEDURE — 25010000002 ROPIVACAINE PER 1 MG: Performed by: NURSE ANESTHETIST, CERTIFIED REGISTERED

## 2021-01-07 PROCEDURE — 25010000002 ONDANSETRON PER 1 MG: Performed by: ANESTHESIOLOGY

## 2021-01-07 PROCEDURE — C1755 CATHETER, INTRASPINAL: HCPCS

## 2021-01-07 PROCEDURE — C1755 CATHETER, INTRASPINAL: HCPCS | Performed by: ANESTHESIOLOGY

## 2021-01-07 PROCEDURE — 25010000002 METOCLOPRAMIDE PER 10 MG: Performed by: ANESTHESIOLOGY

## 2021-01-07 PROCEDURE — 0HQ9XZZ REPAIR PERINEUM SKIN, EXTERNAL APPROACH: ICD-10-PCS | Performed by: ADVANCED PRACTICE MIDWIFE

## 2021-01-07 PROCEDURE — 25010000002 FENTANYL CITRATE (PF) 100 MCG/2ML SOLUTION: Performed by: ANESTHESIOLOGY

## 2021-01-07 PROCEDURE — 59025 FETAL NON-STRESS TEST: CPT

## 2021-01-07 PROCEDURE — 25010000002 ROPIVACAINE PER 1 MG: Performed by: ANESTHESIOLOGY

## 2021-01-07 RX ORDER — LIDOCAINE HYDROCHLORIDE AND EPINEPHRINE 20; 5 MG/ML; UG/ML
INJECTION, SOLUTION EPIDURAL; INFILTRATION; INTRACAUDAL; PERINEURAL AS NEEDED
Status: DISCONTINUED | OUTPATIENT
Start: 2021-01-07 | End: 2021-01-07 | Stop reason: SURG

## 2021-01-07 RX ORDER — IBUPROFEN 600 MG/1
600 TABLET ORAL ONCE AS NEEDED
Status: COMPLETED | OUTPATIENT
Start: 2021-01-07 | End: 2021-01-07

## 2021-01-07 RX ORDER — MISOPROSTOL 200 UG/1
800 TABLET ORAL AS NEEDED
Status: DISCONTINUED | OUTPATIENT
Start: 2021-01-07 | End: 2021-01-07 | Stop reason: HOSPADM

## 2021-01-07 RX ORDER — SODIUM CHLORIDE 0.9 % (FLUSH) 0.9 %
1-10 SYRINGE (ML) INJECTION AS NEEDED
Status: DISCONTINUED | OUTPATIENT
Start: 2021-01-07 | End: 2021-01-07

## 2021-01-07 RX ORDER — OXYTOCIN-SODIUM CHLORIDE 0.9% IV SOLN 30 UNIT/500ML 30-0.9/5 UT/ML-%
85 SOLUTION INTRAVENOUS ONCE
Status: COMPLETED | OUTPATIENT
Start: 2021-01-07 | End: 2021-01-07

## 2021-01-07 RX ORDER — EPHEDRINE SULFATE 5 MG/ML
10 INJECTION INTRAVENOUS
Status: DISCONTINUED | OUTPATIENT
Start: 2021-01-07 | End: 2021-01-07 | Stop reason: HOSPADM

## 2021-01-07 RX ORDER — TRISODIUM CITRATE DIHYDRATE AND CITRIC ACID MONOHYDRATE 500; 334 MG/5ML; MG/5ML
30 SOLUTION ORAL ONCE
Status: DISCONTINUED | OUTPATIENT
Start: 2021-01-07 | End: 2021-01-07 | Stop reason: HOSPADM

## 2021-01-07 RX ORDER — ROPIVACAINE HYDROCHLORIDE 2 MG/ML
15 INJECTION, SOLUTION EPIDURAL; INFILTRATION; PERINEURAL CONTINUOUS
Status: DISCONTINUED | OUTPATIENT
Start: 2021-01-07 | End: 2021-01-07

## 2021-01-07 RX ORDER — DIPHENHYDRAMINE HYDROCHLORIDE 50 MG/ML
12.5 INJECTION INTRAMUSCULAR; INTRAVENOUS EVERY 8 HOURS PRN
Status: DISCONTINUED | OUTPATIENT
Start: 2021-01-07 | End: 2021-01-07 | Stop reason: HOSPADM

## 2021-01-07 RX ORDER — ROPIVACAINE HYDROCHLORIDE 5 MG/ML
INJECTION, SOLUTION EPIDURAL; INFILTRATION; PERINEURAL AS NEEDED
Status: DISCONTINUED | OUTPATIENT
Start: 2021-01-07 | End: 2021-01-07 | Stop reason: SURG

## 2021-01-07 RX ORDER — OXYTOCIN-SODIUM CHLORIDE 0.9% IV SOLN 30 UNIT/500ML 30-0.9/5 UT/ML-%
650 SOLUTION INTRAVENOUS ONCE
Status: COMPLETED | OUTPATIENT
Start: 2021-01-07 | End: 2021-01-07

## 2021-01-07 RX ORDER — METHYLERGONOVINE MALEATE 0.2 MG/ML
200 INJECTION INTRAVENOUS ONCE AS NEEDED
Status: DISCONTINUED | OUTPATIENT
Start: 2021-01-07 | End: 2021-01-07 | Stop reason: HOSPADM

## 2021-01-07 RX ORDER — BISACODYL 10 MG
10 SUPPOSITORY, RECTAL RECTAL DAILY PRN
Status: DISCONTINUED | OUTPATIENT
Start: 2021-01-08 | End: 2021-01-09 | Stop reason: HOSPADM

## 2021-01-07 RX ORDER — METOCLOPRAMIDE HYDROCHLORIDE 5 MG/ML
10 INJECTION INTRAMUSCULAR; INTRAVENOUS ONCE AS NEEDED
Status: COMPLETED | OUTPATIENT
Start: 2021-01-07 | End: 2021-01-07

## 2021-01-07 RX ORDER — DOCUSATE SODIUM 100 MG/1
100 CAPSULE, LIQUID FILLED ORAL 2 TIMES DAILY
Status: DISCONTINUED | OUTPATIENT
Start: 2021-01-07 | End: 2021-01-09 | Stop reason: HOSPADM

## 2021-01-07 RX ORDER — IBUPROFEN 600 MG/1
600 TABLET ORAL EVERY 6 HOURS PRN
Status: DISCONTINUED | OUTPATIENT
Start: 2021-01-07 | End: 2021-01-09 | Stop reason: HOSPADM

## 2021-01-07 RX ORDER — ONDANSETRON 2 MG/ML
4 INJECTION INTRAMUSCULAR; INTRAVENOUS EVERY 6 HOURS PRN
Status: DISCONTINUED | OUTPATIENT
Start: 2021-01-07 | End: 2021-01-07

## 2021-01-07 RX ORDER — PRENATAL VIT/IRON FUM/FOLIC AC 27MG-0.8MG
1 TABLET ORAL DAILY
Status: DISCONTINUED | OUTPATIENT
Start: 2021-01-07 | End: 2021-01-09 | Stop reason: HOSPADM

## 2021-01-07 RX ORDER — HYDROCORTISONE 25 MG/G
1 CREAM TOPICAL AS NEEDED
Status: DISCONTINUED | OUTPATIENT
Start: 2021-01-07 | End: 2021-01-09 | Stop reason: HOSPADM

## 2021-01-07 RX ORDER — LIDOCAINE HYDROCHLORIDE AND EPINEPHRINE 15; 5 MG/ML; UG/ML
INJECTION, SOLUTION EPIDURAL AS NEEDED
Status: DISCONTINUED | OUTPATIENT
Start: 2021-01-07 | End: 2021-01-07 | Stop reason: SURG

## 2021-01-07 RX ORDER — FAMOTIDINE 10 MG/ML
20 INJECTION, SOLUTION INTRAVENOUS ONCE AS NEEDED
Status: DISCONTINUED | OUTPATIENT
Start: 2021-01-07 | End: 2021-01-07 | Stop reason: HOSPADM

## 2021-01-07 RX ORDER — ONDANSETRON 2 MG/ML
4 INJECTION INTRAMUSCULAR; INTRAVENOUS ONCE AS NEEDED
Status: COMPLETED | OUTPATIENT
Start: 2021-01-07 | End: 2021-01-07

## 2021-01-07 RX ORDER — FENTANYL CITRATE 50 UG/ML
INJECTION, SOLUTION INTRAMUSCULAR; INTRAVENOUS AS NEEDED
Status: DISCONTINUED | OUTPATIENT
Start: 2021-01-07 | End: 2021-01-07 | Stop reason: SURG

## 2021-01-07 RX ORDER — LANOLIN
CREAM (ML) TOPICAL
Status: DISCONTINUED | OUTPATIENT
Start: 2021-01-07 | End: 2021-01-09 | Stop reason: HOSPADM

## 2021-01-07 RX ORDER — CARBOPROST TROMETHAMINE 250 UG/ML
250 INJECTION, SOLUTION INTRAMUSCULAR AS NEEDED
Status: DISCONTINUED | OUTPATIENT
Start: 2021-01-07 | End: 2021-01-07 | Stop reason: HOSPADM

## 2021-01-07 RX ADMIN — ROPIVACAINE HYDROCHLORIDE 7 ML: 5 INJECTION, SOLUTION EPIDURAL; INFILTRATION; PERINEURAL at 09:25

## 2021-01-07 RX ADMIN — SODIUM CHLORIDE, POTASSIUM CHLORIDE, SODIUM LACTATE AND CALCIUM CHLORIDE 125 ML/HR: 600; 310; 30; 20 INJECTION, SOLUTION INTRAVENOUS at 08:18

## 2021-01-07 RX ADMIN — LIDOCAINE HYDROCHLORIDE,EPINEPHRINE BITARTRATE 4 ML: 20; .005 INJECTION, SOLUTION EPIDURAL; INFILTRATION; INTRACAUDAL; PERINEURAL at 11:35

## 2021-01-07 RX ADMIN — METOCLOPRAMIDE 10 MG: 5 INJECTION, SOLUTION INTRAMUSCULAR; INTRAVENOUS at 08:19

## 2021-01-07 RX ADMIN — WITCH HAZEL 1 PAD: 500 SOLUTION RECTAL; TOPICAL at 17:07

## 2021-01-07 RX ADMIN — LIDOCAINE HYDROCHLORIDE AND EPINEPHRINE 3 ML: 15; 5 INJECTION, SOLUTION EPIDURAL at 00:25

## 2021-01-07 RX ADMIN — FAMOTIDINE 20 MG: 10 INJECTION, SOLUTION INTRAVENOUS at 08:18

## 2021-01-07 RX ADMIN — ROPIVACAINE HYDROCHLORIDE 15 ML/HR: 2 INJECTION, SOLUTION EPIDURAL; INFILTRATION at 07:20

## 2021-01-07 RX ADMIN — ROPIVACAINE HYDROCHLORIDE 6 ML: 5 INJECTION, SOLUTION EPIDURAL; INFILTRATION; PERINEURAL at 11:35

## 2021-01-07 RX ADMIN — LIDOCAINE HYDROCHLORIDE AND EPINEPHRINE 2 ML: 15; 5 INJECTION, SOLUTION EPIDURAL at 00:27

## 2021-01-07 RX ADMIN — ROPIVACAINE HYDROCHLORIDE 13 ML: 5 INJECTION, SOLUTION EPIDURAL; INFILTRATION; PERINEURAL at 00:29

## 2021-01-07 RX ADMIN — SODIUM CHLORIDE, POTASSIUM CHLORIDE, SODIUM LACTATE AND CALCIUM CHLORIDE 125 ML/HR: 600; 310; 30; 20 INJECTION, SOLUTION INTRAVENOUS at 01:14

## 2021-01-07 RX ADMIN — OXYTOCIN 85 ML/HR: 10 INJECTION INTRAVENOUS at 13:18

## 2021-01-07 RX ADMIN — IBUPROFEN 600 MG: 600 TABLET ORAL at 20:57

## 2021-01-07 RX ADMIN — FENTANYL CITRATE 100 MCG: 50 INJECTION, SOLUTION INTRAMUSCULAR; INTRAVENOUS at 00:33

## 2021-01-07 RX ADMIN — IBUPROFEN 600 MG: 600 TABLET, FILM COATED ORAL at 14:56

## 2021-01-07 RX ADMIN — EPHEDRINE SULFATE 10 MG: 5 INJECTION INTRAVENOUS at 01:16

## 2021-01-07 RX ADMIN — Medication: at 17:07

## 2021-01-07 RX ADMIN — DOCUSATE SODIUM 100 MG: 100 CAPSULE ORAL at 20:57

## 2021-01-07 RX ADMIN — OXYTOCIN 650 ML/HR: 10 INJECTION INTRAVENOUS at 13:02

## 2021-01-07 RX ADMIN — ROPIVACAINE HYDROCHLORIDE 15 ML/HR: 2 INJECTION, SOLUTION EPIDURAL; INFILTRATION at 00:35

## 2021-01-07 RX ADMIN — EPHEDRINE SULFATE 10 MG: 5 INJECTION INTRAVENOUS at 04:20

## 2021-01-07 RX ADMIN — ONDANSETRON 4 MG: 2 INJECTION INTRAMUSCULAR; INTRAVENOUS at 01:23

## 2021-01-07 NOTE — L&D DELIVERY NOTE
[facility]  Vaginal Delivery Note    Delivery     Delivery: Vaginal, Spontaneous     YOB: 2023    Time of Birth:  Gestational Age 12:55 PM   40w1d     Anesthesia: Epidural     Delivering clinician: Lindsey Greco    Forceps?   No   Vacuum? No    Shoulder dystocia present: No        Delivery narrative:  Patient at 40w1d pushed to deliver a viable male infant over a first degree laceration with epidural anesthesia. Infant's head, anterior shoulder, and body delivered atraumatically. Body cord noted around LE. Vigorous infant was placed on mom's abdomen where the cord was allowed to stop pulsating and was clamped x2 and cut by FOB. Placenta delivered spontaneously and appeared to be intact. Laceration repaired with 2-0 Vicryl rapide. EBL 200ml. Mother and infant stable, bonding.         Infant    Findings: male  infant     Infant observations: Weight: 3040 g (6 lb 11.2 oz)   Length:   in  Observations/Comments:        Apgars: 8  @ 1 minute /    9  @ 5 minutes   Infant Name: Ha     Placenta, Cord, and Fluid    Placenta delivered  Spontaneous  at   1/7/2021  1:02 PM     Cord:   present.   Nuchal Cord?  no- Body cord   Cord blood obtained: Yes    Cord gases obtained:  No              Repair    Episiotomy: None         Lacerations: Yes  Laceration Information  Laceration Repaired?   Perineal: 1st  Yes    Periurethral:       Labial:       Sulcus:       Vaginal:       Cervical:         Suture used for repair: 2-0 Vicryl rapide   Estimated Blood Loss:  200ml           Complications  none    Disposition  Mother to Mother Baby/Postpartum  in stable condition currently.  Baby to remains with mom  in stable condition currently.      Lindsey Greco CNM  01/07/21  13:19 EST

## 2021-01-07 NOTE — ANESTHESIA PROCEDURE NOTES
Labor Epidural      Patient reassessed immediately prior to procedure    Patient location during procedure: OB  Performed By  Anesthesiologist: Alice Patel DO  Preanesthetic Checklist  Completed: patient identified, surgical consent, pre-op evaluation, timeout performed, IV checked, risks and benefits discussed and monitors and equipment checked  Additional Notes  DPE #25g jazzy  Prep:  Pt Position:sitting  Sterile Tech:cap, gloves, mask and sterile barrier  Prep:DuraPrep  Monitoring:blood pressure monitoring  Epidural Block Procedure:  Approach:midline  Guidance:palpation technique  Location:L3-L4  Needle Type:Tuohy  Needle Gauge:17 G  Loss of Resistance Medium: air  Loss of Resistance: 5cm  Cath Depth at skin:12 cm  Paresthesia: none  Aspiration:negative  Test Dose:negative  Number of Attempts: 1  Post Assessment:  Dressing:occlusive dressing applied and secured with tape  Pt Tolerance:patient tolerated the procedure well with no apparent complications  Complications:no

## 2021-01-07 NOTE — PLAN OF CARE
Problem: Bleeding (Labor)  Goal: Hemostasis  Outcome: Met     Problem: Change in Fetal Wellbeing (Labor)  Goal: Stable Fetal Wellbeing  Outcome: Met  Intervention: Promote and Monitor Fetal Wellbeing  Recent Flowsheet Documentation  Taken 1/7/2021 0715 by Lexie Peña, RN  Body Position: side-lying, right     Problem: Delayed Labor Progression (Labor)  Goal: Effective Progression to Delivery  Outcome: Met     Problem: Infection (Labor)  Goal: Absence of Infection Signs and Symptoms  Outcome: Met     Problem: Labor Pain (Labor)  Goal: Acceptable Pain Control  Outcome: Met     Problem: Uterine Tachysystole (Labor)  Goal: Normal Uterine Contraction Pattern  Outcome: Met     Problem: Skin Injury Risk Increased  Goal: Skin Health and Integrity  Outcome: Met  Intervention: Optimize Skin Protection  Recent Flowsheet Documentation  Taken 1/7/2021 0715 by Lexie Peña, RN  Head of Bed (HOB): HOB elevated   Goal Outcome Evaluation:

## 2021-01-07 NOTE — ANESTHESIA PREPROCEDURE EVALUATION
Anesthesia Evaluation     Patient summary reviewed and Nursing notes reviewed   NPO Solid Status: > 6 hours  NPO Liquid Status: > 6 hours           Airway   Mallampati: II  TM distance: >3 FB  Neck ROM: full  No difficulty expected  Dental      Pulmonary - negative pulmonary ROS   Cardiovascular - negative cardio ROS        Neuro/Psych- negative ROS  GI/Hepatic/Renal/Endo    (+) obesity, morbid obesity,      Musculoskeletal (-) negative ROS    Abdominal    Substance History - negative use     OB/GYN    (+) Pregnant,         Other - negative ROS                       Anesthesia Plan    ASA 2     epidural       Anesthetic plan, all risks, benefits, and alternatives have been provided, discussed and informed consent has been obtained with: patient and spouse/significant other.

## 2021-01-08 LAB
BASOPHILS # BLD AUTO: 0.04 10*3/MM3 (ref 0–0.2)
BASOPHILS NFR BLD AUTO: 0.3 % (ref 0–1.5)
DEPRECATED RDW RBC AUTO: 45.9 FL (ref 37–54)
EOSINOPHIL # BLD AUTO: 0.03 10*3/MM3 (ref 0–0.4)
EOSINOPHIL NFR BLD AUTO: 0.2 % (ref 0.3–6.2)
ERYTHROCYTE [DISTWIDTH] IN BLOOD BY AUTOMATED COUNT: 13.6 % (ref 12.3–15.4)
HCT VFR BLD AUTO: 35 % (ref 34–46.6)
HGB BLD-MCNC: 11.3 G/DL (ref 12–15.9)
IMM GRANULOCYTES # BLD AUTO: 0.03 10*3/MM3 (ref 0–0.05)
IMM GRANULOCYTES NFR BLD AUTO: 0.2 % (ref 0–0.5)
LYMPHOCYTES # BLD AUTO: 1.74 10*3/MM3 (ref 0.7–3.1)
LYMPHOCYTES NFR BLD AUTO: 14.3 % (ref 19.6–45.3)
MCH RBC QN AUTO: 29.7 PG (ref 26.6–33)
MCHC RBC AUTO-ENTMCNC: 32.3 G/DL (ref 31.5–35.7)
MCV RBC AUTO: 92.1 FL (ref 79–97)
MONOCYTES # BLD AUTO: 0.61 10*3/MM3 (ref 0.1–0.9)
MONOCYTES NFR BLD AUTO: 5 % (ref 5–12)
NEUTROPHILS NFR BLD AUTO: 80 % (ref 42.7–76)
NEUTROPHILS NFR BLD AUTO: 9.76 10*3/MM3 (ref 1.7–7)
NRBC BLD AUTO-RTO: 0 /100 WBC (ref 0–0.2)
PLATELET # BLD AUTO: 180 10*3/MM3 (ref 140–450)
PMV BLD AUTO: 13 FL (ref 6–12)
RBC # BLD AUTO: 3.8 10*6/MM3 (ref 3.77–5.28)
WBC # BLD AUTO: 12.21 10*3/MM3 (ref 3.4–10.8)

## 2021-01-08 PROCEDURE — 85025 COMPLETE CBC W/AUTO DIFF WBC: CPT | Performed by: ADVANCED PRACTICE MIDWIFE

## 2021-01-08 RX ADMIN — IBUPROFEN 600 MG: 600 TABLET ORAL at 22:45

## 2021-01-08 RX ADMIN — IBUPROFEN 600 MG: 600 TABLET ORAL at 06:13

## 2021-01-08 RX ADMIN — DOCUSATE SODIUM 100 MG: 100 CAPSULE ORAL at 08:45

## 2021-01-08 RX ADMIN — IBUPROFEN 600 MG: 600 TABLET ORAL at 16:38

## 2021-01-08 RX ADMIN — PRENATAL VITAMINS-IRON FUMARATE 27 MG IRON-FOLIC ACID 0.8 MG TABLET 1 TABLET: at 08:45

## 2021-01-08 RX ADMIN — DOCUSATE SODIUM 100 MG: 100 CAPSULE ORAL at 20:30

## 2021-01-08 RX ADMIN — HYDROCORTISONE 2.5% 1 APPLICATION: 25 CREAM TOPICAL at 06:13

## 2021-01-08 NOTE — ANESTHESIA POSTPROCEDURE EVALUATION
Patient: Stephanie Quintanilla    Procedure Summary     Date: 01/07/21 Room / Location:     Anesthesia Start: 0018 Anesthesia Stop: 1302    Procedure: LABOR ANALGESIA Diagnosis:     Scheduled Providers:  Provider: Alice Patel DO    Anesthesia Type: epidural ASA Status: 2          Anesthesia Type: epidural    Vitals  Vitals Value Taken Time   /74 01/08/21 0700   Temp 98.5 °F (36.9 °C) 01/08/21 0700   Pulse 95 01/08/21 0700   Resp 16 01/08/21 0700   SpO2             Post Anesthesia Care and Evaluation    Patient location during evaluation: bedside  Patient participation: complete - patient participated  Level of consciousness: awake and alert  Pain management: adequate  Airway patency: patent  Anesthetic complications: No anesthetic complications    Cardiovascular status: acceptable  Respiratory status: acceptable  Hydration status: acceptable  Post Neuraxial Block status: Motor and sensory function returned to baseline and No signs or symptoms of PDPH

## 2021-01-08 NOTE — PLAN OF CARE
Goal Outcome Evaluation:  Plan of Care Reviewed With: patient  Progress: improving   Vss- pumping- vdng- lochia wnl-

## 2021-01-08 NOTE — PROGRESS NOTES
[facility]  Vaginal Delivery Progress Note    Subjective     Doing well, pain controlled, lochia less than menses. Emotional, tearful.  Baby in NICU      Objective     Vital Signs Range for the last 24 hours  Temperature: Temp:  [97.8 °F (36.6 °C)-98.5 °F (36.9 °C)] 98.5 °F (36.9 °C)   Temp Source: Temp src: Oral   BP: BP: (109-134)/(58-81) 129/74   Pulse: Heart Rate:  [] 95   Respirations: Resp:  [16-18] 16   SPO2:     O2 Amount (l/min):     O2 Devices           Physical Exam:  General:  no acute distresss.  Abdomen: Soft, non-tender, fundus firm  Extremities: normal, atraumatic, no cyanosis, and trace edema.       Lab results reviewed:  Yes    Lab Results   Component Value Date    WBC 8.01 2021    HGB 12.0 2021    HCT 36.2 2021    MCV 91.9 2021     2021         Assessment/Plan        (normal spontaneous vaginal delivery)      Stephanie Quintanilla is Day 1  post-partum       Plan:  Continue current care.  Will start Zoloft.       Grace Tai CNM  2021  12:28 EST

## 2021-01-08 NOTE — LACTATION NOTE
01/07/21 1730   Maternal Information   Person Making Referral nurse;patient   Maternal Reason for Referral   (nursing staff initiated pumping)   Infant Reason for Referral   (baby transferred from well baby nursery to NICU observation)   Milk Expression/Equipment   Breast Pump Type double electric, hospital grade  (using hospital pump)   Breast Pump Flange Type hard   Breast Pump Flange Size 24 mm   Breast Pumping   Breast Pumping Interventions early pumping promoted;frequent pumping encouraged   Teaching done as documented under Education. To call lactation services, if there are questions or concerns. If baby admitted to NICU, will do additional teaching tomorrow.

## 2021-01-08 NOTE — PLAN OF CARE
Problem: Breastfeeding  Goal: Effective Breastfeeding  Outcome: Ongoing, Progressing  Intervention: Promote Effective Breastfeeding  Flowsheets (Taken 1/7/2021 1730)  Breastfeeding Assistance: support offered  Parent/Child Attachment Promotion:   participation in care promoted   positive reinforcement provided   skin-to-skin contact encouraged   strengths emphasized   caring behavior modeled  Intervention: Support Exclusive Breastfeeding Success  Flowsheets (Taken 1/7/2021 1730)  Supportive Measures: active listening utilized  Breastfeeding Support:   diary/feeding log utilized   encouragement provided   lactation counseling provided   Goal Outcome Evaluation:

## 2021-01-09 VITALS
RESPIRATION RATE: 16 BRPM | SYSTOLIC BLOOD PRESSURE: 119 MMHG | DIASTOLIC BLOOD PRESSURE: 68 MMHG | TEMPERATURE: 97.7 F | BODY MASS INDEX: 35.94 KG/M2 | HEART RATE: 69 BPM | WEIGHT: 229 LBS | HEIGHT: 67 IN

## 2021-01-09 PROCEDURE — 90471 IMMUNIZATION ADMIN: CPT | Performed by: OBSTETRICS & GYNECOLOGY

## 2021-01-09 PROCEDURE — 90707 MMR VACCINE SC: CPT | Performed by: OBSTETRICS & GYNECOLOGY

## 2021-01-09 PROCEDURE — 25010000002 MEASLES, MUMPS & RUBELLA VAC RECONSTITUTED SOLUTION: Performed by: OBSTETRICS & GYNECOLOGY

## 2021-01-09 RX ORDER — PSEUDOEPHEDRINE HCL 30 MG
100 TABLET ORAL 2 TIMES DAILY PRN
Qty: 60 CAPSULE | Refills: 1 | Status: SHIPPED | OUTPATIENT
Start: 2021-01-09 | End: 2022-05-27

## 2021-01-09 RX ORDER — IBUPROFEN 600 MG/1
600 TABLET ORAL EVERY 6 HOURS PRN
Qty: 60 TABLET | Refills: 1 | Status: SHIPPED | OUTPATIENT
Start: 2021-01-09 | End: 2022-05-27

## 2021-01-09 RX ADMIN — PRENATAL VITAMINS-IRON FUMARATE 27 MG IRON-FOLIC ACID 0.8 MG TABLET 1 TABLET: at 07:46

## 2021-01-09 RX ADMIN — MEASLES, MUMPS, AND RUBELLA VIRUS VACCINE LIVE 0.5 ML: 1000; 12500; 1000 INJECTION, POWDER, LYOPHILIZED, FOR SUSPENSION SUBCUTANEOUS at 07:46

## 2021-01-09 RX ADMIN — DOCUSATE SODIUM 100 MG: 100 CAPSULE ORAL at 07:46

## 2021-01-09 RX ADMIN — IBUPROFEN 600 MG: 600 TABLET ORAL at 04:45

## 2021-01-09 RX ADMIN — WITCH HAZEL 1 PAD: 500 SOLUTION RECTAL; TOPICAL at 04:45

## 2021-01-09 NOTE — LACTATION NOTE
"   01/08/21 2035   Maternal Information   Person Making Referral physician   Maternal Reason for Referral   (mom has been pumping every 3 hours and getting drops)   Infant Reason for Referral NICU admission   Milk Expression/Equipment   Breast Pump Type double electric, personal;double electric, hospital grade  (using hospital pump; has lansinoh pump for home)   Breast Pump Flange Type hard   Breast Pump Flange Size 24 mm   Breast Pumping   Breast Pumping Interventions early pumping promoted;frequent pumping encouraged   Encouraged to continue pumping every 3 hours to get best milk supply possible. Gave information about online videos by [employer], \"Maximizing Milk Production\" and \"hand expression\" that demonstrate how to use hands to help with milk expression. To sterilize pump parts every 24 hours while baby is in NICU--gave microwave steam bags and reviewed use. Gave Pump for [employer] contract and discussed terms of contract, including that this pump needs to be returned to NICU when baby is discharged home. Discussed renting symphony pump for the first month to help with establishing milk supply. Mom has lansinoh pump for home. Teaching done as documented under Education. Discussed how to avoid and treat engorgement when milk comes in. To call lactation services, if there are questions or concerns.   "

## 2021-01-09 NOTE — DISCHARGE SUMMARY
[facility]  Vaginal delivery discharge summary      Patient: Stephanie Quintanilla      MR#:[id number]  Admission  Diagnosis:   Patient Active Problem List   Diagnosis   • Dyslipidemia   • Vitamin D deficiency   • Elevated LDL cholesterol level   • Morbidly obese (CMS/HCC)   •  (normal spontaneous vaginal delivery)     Discharge Diagnosis:   1. Morbidly obese (CMS/HCC)    2.       (normal spontaneous vaginal delivery)      Date of Admission: 2021  Date of Discharge:  2021    Procedures:  Vaginal, Spontaneous     2021    12:55 PM      Service:  Obstetrics    Hospital Course:  Patient underwent vaginal delivery and remained in the hospital for 3 days.  During that time she remained afebrile and hemodynamically stable.  On the day of discharge, she was eating, ambulating and voiding without difficulty.  She is pumping for infant in NICU.    Labs:    Lab Results   Component Value Date    WBC 12.21 (H) 2021    HGB 11.3 (L) 2021    HCT 35.0 2021    MCV 92.1 2021     2021    GLU 90 2018    URICACID 3.7 2021    AST 16 2021    ALT 12 2021     (H) 2021     Results from last 7 days   Lab Units 21  1354   ABO TYPING  A   RH TYPING  Positive   ANTIBODY SCREEN  Negative       Discharge Medications     Discharge Medications      New Medications      Instructions Start Date   docusate sodium 100 MG capsule   100 mg, Oral, 2 Times Daily PRN      ibuprofen 600 MG tablet  Commonly known as: ADVIL,MOTRIN   600 mg, Oral, Every 6 Hours PRN         Continue These Medications      Instructions Start Date   Prenatal 27-1 27-1 MG tablet tablet   1 tablet, Oral, Daily         Stop These Medications    brompheniramine-pseudoephedrine-DM 30-2-10 MG/5ML syrup     etonogestrel-ethinyl estradiol 0.12-0.015 MG/24HR vaginal ring  Commonly known as: NUVARING            Discharge Disposition:  To Home    Discharge Condition:  Stable    Discharge  Diet: Regular    Activity at Discharge: Pelvic rest    Follow-up Appointments  Follow up with [facility] in 6 weeks.     Lindsey Greco CNM  01/09/21  09:23 EST

## 2021-01-09 NOTE — PROGRESS NOTES
1/9/2021  PPD #2    Subjective   Stephanie feels well.  Patient describes her lochia as less than menses.  Pain is well controlled  She is pumping for infant in NICU.      Objective   Temp: Temp:  [97.7 °F (36.5 °C)-98.1 °F (36.7 °C)] 97.7 °F (36.5 °C) Temp src: Oral   BP: BP: (107-133)/(57-87) 119/68        Pulse: Heart Rate:  [67-71] 69  RR: Resp:  [16] 16    General:  No acute distress   Abdomen: Fundus firm and beneath umbilicus   Pelvis: deferred     Lab Results   Component Value Date    WBC 12.21 (H) 01/08/2021    HGB 11.3 (L) 01/08/2021    HCT 35.0 01/08/2021    MCV 92.1 01/08/2021     01/08/2021    HEPBSAG Non-Reactive 06/02/2020    AST 16 01/06/2021    URICACID 3.7 01/06/2021    GLU 90 06/01/2018       Assessment  1. PPD# 2 after vaginal delivery  2.   At risk for PPD secondary to infant in NICU  Plan  1. Discharge to home  2. Discussed s/s of PPD, resources offered  3. Follow up with [facility]  in 6 weeks  4. Prescriptions for Motrin and Colace prescribed and advised on weaning.  5. Advised no tampons, intercourse, or tub baths for 6 weeks.       This note has been electronically signed.    Lindsey Greco CNM  09:25 EST  January 9, 2021

## 2021-01-13 ENCOUNTER — TRANSCRIBE ORDERS (OUTPATIENT)
Dept: LAB | Facility: HOSPITAL | Age: 31
End: 2021-01-13

## 2021-01-13 ENCOUNTER — LAB (OUTPATIENT)
Dept: LAB | Facility: HOSPITAL | Age: 31
End: 2021-01-13

## 2021-01-13 DIAGNOSIS — R35.0 URINARY FREQUENCY: ICD-10-CM

## 2021-01-13 DIAGNOSIS — R35.0 URINARY FREQUENCY: Primary | ICD-10-CM

## 2021-01-13 PROCEDURE — 81001 URINALYSIS AUTO W/SCOPE: CPT

## 2021-01-14 LAB
BACTERIA UR QL AUTO: NORMAL /HPF
BILIRUB UR QL STRIP: NEGATIVE
CLARITY UR: CLEAR
COLOR UR: YELLOW
GLUCOSE UR STRIP-MCNC: NEGATIVE MG/DL
HGB UR QL STRIP.AUTO: ABNORMAL
HYALINE CASTS UR QL AUTO: NORMAL /LPF
KETONES UR QL STRIP: NEGATIVE
LEUKOCYTE ESTERASE UR QL STRIP.AUTO: ABNORMAL
NITRITE UR QL STRIP: NEGATIVE
PH UR STRIP.AUTO: 7 [PH] (ref 5–8)
PROT UR QL STRIP: NEGATIVE
RBC # UR: NORMAL /HPF
REF LAB TEST METHOD: NORMAL
SP GR UR STRIP: 1.01 (ref 1–1.03)
SQUAMOUS #/AREA URNS HPF: NORMAL /HPF
UROBILINOGEN UR QL STRIP: ABNORMAL
WBC UR QL AUTO: NORMAL /HPF

## 2022-03-14 ENCOUNTER — OFFICE VISIT (OUTPATIENT)
Dept: INTERNAL MEDICINE | Facility: CLINIC | Age: 32
End: 2022-03-14

## 2022-03-14 VITALS
BODY MASS INDEX: 31.67 KG/M2 | TEMPERATURE: 97.3 F | DIASTOLIC BLOOD PRESSURE: 80 MMHG | WEIGHT: 202.2 LBS | HEART RATE: 72 BPM | OXYGEN SATURATION: 99 % | SYSTOLIC BLOOD PRESSURE: 110 MMHG

## 2022-03-14 DIAGNOSIS — H81.12 BENIGN PAROXYSMAL POSITIONAL VERTIGO OF LEFT EAR: Primary | ICD-10-CM

## 2022-03-14 PROCEDURE — 99213 OFFICE O/P EST LOW 20 MIN: CPT | Performed by: STUDENT IN AN ORGANIZED HEALTH CARE EDUCATION/TRAINING PROGRAM

## 2022-03-14 RX ORDER — ONDANSETRON 4 MG/1
4 TABLET, ORALLY DISINTEGRATING ORAL EVERY 8 HOURS PRN
Qty: 10 TABLET | Refills: 0 | Status: SHIPPED | OUTPATIENT
Start: 2022-03-14 | End: 2022-05-27

## 2022-03-14 RX ORDER — METHYLPREDNISOLONE 4 MG/1
TABLET ORAL
Qty: 21 EACH | Refills: 0 | Status: SHIPPED | OUTPATIENT
Start: 2022-03-14 | End: 2022-05-27

## 2022-03-17 ENCOUNTER — TREATMENT (OUTPATIENT)
Dept: PHYSICAL THERAPY | Facility: CLINIC | Age: 32
End: 2022-03-17

## 2022-03-17 DIAGNOSIS — R42 VERTIGO: Primary | ICD-10-CM

## 2022-03-17 PROCEDURE — 95992 CANALITH REPOSITIONING PROC: CPT | Performed by: PHYSICAL THERAPIST

## 2022-03-17 PROCEDURE — 97162 PT EVAL MOD COMPLEX 30 MIN: CPT | Performed by: PHYSICAL THERAPIST

## 2022-03-17 NOTE — PROGRESS NOTES
Physical Therapy Initial Evaluation and Plan of Care      Patient: Stephanie Quintanilla   : 1990  Diagnosis/ICD-10 Code:  Vertigo [R42]  Referring practitioner: Arthur Lerner MD  Date of Initial Visit: 3/17/2022  Today's Date: 3/17/2022  Patient seen for 1 sessions      Subjective:     Subjective Evaluation    History of Present Illness  Mechanism of injury: Patient states she woke up saty morning and was extremely dizzy with a little nausea. Still dizzy on  morning but worse with quick movement. Particularly leaning forward and turning to one side. It has gotten better each day but L side is still extreme. Driving seems to be ok.     Pain  No pain reported         Objective     PT Neuro   22 0800   Symptom Behavior   Type of Dizziness World moves;Sensation of motion at rest   Frequency of Dizziness Several Times a Day   Duration of Dizziness Seconds   VAS Intensity (0-10) 5   Aggravating Factors Turning body quickly;Turning head quickly;Turning head sideways;Forward bending   Relieving Factors Rest   Occulomotor Exam Fixation Present   Occular ROM Normal   Spontaneous Nystagmus Absent   Smooth Pursuit Normal;Symptom Provoking   Saccades Bilateral:;Intact  (symptomatic)   Convergence Normal   Positional Testing   Matlock-Hallpike Right No nystagmus   Matlock-Hallpike Left Upbeat, left rotatory nystagmus   Treatment   Treatment L Epley        Assessment & Plan     Assessment  Impairments: lacks appropriate home exercise program  Functional Limitations: moving in bed and stooping  Assessment details: Patient is a 32 YOF that presents to PT with complaints of new onset dizziness that started on Saturday. Assessment reveals L posterior canal BPPV. A L epley maneuver was performed that did decrease symptoms quite significantly. Patient was administered the maneuver for home performance. She will continue at home until she is symptom free for 24 hrs. Scheduling will be based as needed.   Prognosis:  fair    Goals  Plan Goals: SHORT TERM GOALS: To be met in 2 weeks:  1. Patient is independent with HEP.  2. Patient will report at least 30% improvement in overall condition.    LONG TERM GOALS:To be met in 4 weeks:  1. Patient will report decreased disequilibrium/dizziness by at least 90%.  2. Patient will report no loss of balance with ADLs to demonstrate improved functional balance.  3. Patient denies dizziness with daily activity.  4. DHI score is less than 10.     Plan  Therapy options: will be seen for skilled therapy services  Planned therapy interventions: balance/weight-bearing training, home exercise program and neuromuscular re-education  Frequency: 1x week  Duration in visits: 4  Plan details: Patient will be seen 1x/wk for 4 visits with treatment to include canalith repositioning, neuromuscular re-education, balance and gait training, along with therapeutic exercise as indicated.         Timed:  Manual Therapy:    0     mins  24918;  Therapeutic Exercise:    0     mins  20894;     Neuromuscular Meg:    0    mins  45814;    Therapeutic Activity:     0     mins  34986;     Gait Trainin     mins  97482;     Electrical Stimulation:    0     mins  63381 ( );    Untimed:  Canalith Repositioning    x     mins 13846    Timed Treatment:   20   mins   Total Treatment:     45   mins    PT SIGNATURE: Nya Carrillo, PT, DPT, MSCS, CDP  [place] License #[id number]  DATE TREATMENT INITIATED: 3/17/2022      Initial Certification Certification Period: 3/17/3197vowu8/14/2022  I certify that the therapy services are furnished while this patient is under my care.  The services outlined above are required by this patient, and will be reviewed every 90 days.     PHYSICIAN: Arthur Lerner MD  NPI: [id number]                                         DATE:     Please sign and return via fax to [phone].   Thank you,   [facility] Physical Therapy.

## 2022-05-27 ENCOUNTER — OFFICE VISIT (OUTPATIENT)
Dept: INTERNAL MEDICINE | Facility: CLINIC | Age: 32
End: 2022-05-27

## 2022-05-27 VITALS
TEMPERATURE: 97.1 F | HEART RATE: 61 BPM | SYSTOLIC BLOOD PRESSURE: 116 MMHG | WEIGHT: 204 LBS | OXYGEN SATURATION: 98 % | BODY MASS INDEX: 32.02 KG/M2 | DIASTOLIC BLOOD PRESSURE: 76 MMHG | HEIGHT: 67 IN

## 2022-05-27 DIAGNOSIS — R05.9 COUGH: Primary | ICD-10-CM

## 2022-05-27 DIAGNOSIS — J30.9 ALLERGIC RHINITIS, UNSPECIFIED SEASONALITY, UNSPECIFIED TRIGGER: ICD-10-CM

## 2022-05-27 LAB
EXPIRATION DATE: NORMAL
FLUAV AG UPPER RESP QL IA.RAPID: NOT DETECTED
FLUBV AG UPPER RESP QL IA.RAPID: NOT DETECTED
INTERNAL CONTROL: NORMAL
Lab: NORMAL
SARS-COV-2 RNA RESP QL NAA+PROBE: NOT DETECTED

## 2022-05-27 PROCEDURE — 87636 SARSCOV2 & INF A&B AMP PRB: CPT | Performed by: FAMILY MEDICINE

## 2022-05-27 PROCEDURE — 99213 OFFICE O/P EST LOW 20 MIN: CPT | Performed by: FAMILY MEDICINE

## 2022-05-27 RX ORDER — BENZONATATE 100 MG/1
100 CAPSULE ORAL 3 TIMES DAILY PRN
Qty: 15 CAPSULE | Refills: 1 | Status: SHIPPED | OUTPATIENT
Start: 2022-05-27 | End: 2022-11-21

## 2022-05-27 RX ORDER — METHYLPREDNISOLONE 4 MG/1
TABLET ORAL
Qty: 21 EACH | Refills: 0 | Status: SHIPPED | OUTPATIENT
Start: 2022-05-27 | End: 2022-11-21

## 2022-05-27 NOTE — PROGRESS NOTES
"Subjective   Stephanie Quintanilla is a 32 y.o. female.     Chief Complaint   Patient presents with   • Cough   • URI     Congestion.  Symptoms 5 days.           Visit Vitals  /76   Pulse 61   Temp 97.1 °F (36.2 °C)   Ht 170.2 cm (67\")   Wt 92.5 kg (204 lb)   LMP 05/05/2022   SpO2 98%   Breastfeeding No   BMI 31.95 kg/m²         Cough  This is a new problem. The current episode started in the past 7 days. The problem has been unchanged. The cough is non-productive. Associated symptoms include postnasal drip. Pertinent negatives include no chest pain, chills, ear congestion, ear pain, fever, headaches, heartburn, hemoptysis, myalgias, nasal congestion, rash, rhinorrhea, sore throat, shortness of breath, sweats, weight loss or wheezing. Treatments tried: Queenie [place]. The treatment provided moderate relief. There is no history of asthma, bronchiectasis, bronchitis, COPD, emphysema, environmental allergies or pneumonia.   URI   This is a new problem. The current episode started in the past 7 days. The problem has been unchanged. There has been no fever. Associated symptoms include congestion, coughing and swollen glands (Monday). Pertinent negatives include no abdominal pain, chest pain, diarrhea, dysuria, ear pain, headaches, joint pain, joint swelling, nausea, neck pain, plugged ear sensation, rash, rhinorrhea, sinus pain, sneezing, sore throat, vomiting or wheezing. She has tried NSAIDs (queenie seltzer) for the symptoms. The treatment provided moderate relief.      Pt has cough and congestion.    The following portions of the patient's history were reviewed and updated as appropriate: allergies, current medications, past family history, past medical history, past social history, past surgical history and problem list.    Past Medical History:   Diagnosis Date   • Pap smear for cervical cancer screening 10/2017    normal per pt report      Past Surgical History:   Procedure Laterality Date   • KNEE SURGERY " Right 2005?    sports related trauma    • WISDOM TOOTH EXTRACTION  2004?      Family History   Problem Relation Age of Onset   • Atrial fibrillation Mother         pacemaker   • Heart disease Maternal Uncle    • Heart attack Maternal Grandfather 65   • No Known Problems Father    • Anxiety disorder Sister    • Subarachnoid hemorrhage Maternal Grandmother         ANTICOAGULATION AND FALL   • Lung cancer Paternal Grandfather       Social History     Socioeconomic History   • Marital status:    Tobacco Use   • Smoking status: Never Smoker   • Smokeless tobacco: Never Used   Vaping Use   • Vaping Use: Never used   Substance and Sexual Activity   • Alcohol use: Yes     Alcohol/week: 2.0 standard drinks     Types: 2 Glasses of wine per week     Comment: socially   • Drug use: No   • Sexual activity: Yes     Partners: Male     Birth control/protection: OCP      Allergies   Allergen Reactions   • Codeine Anaphylaxis       Review of Systems   Constitutional: Negative for chills, diaphoresis, fatigue, fever and weight loss.   HENT: Positive for congestion, postnasal drip and voice change. Negative for ear pain, rhinorrhea, sinus pain, sneezing and sore throat.    Respiratory: Positive for cough and chest tightness. Negative for hemoptysis, shortness of breath and wheezing.    Cardiovascular: Negative for chest pain.   Gastrointestinal: Negative for abdominal pain, diarrhea, heartburn, nausea and vomiting.   Genitourinary: Negative for dysuria.   Musculoskeletal: Negative for joint pain, myalgias and neck pain.   Skin: Negative for rash.   Allergic/Immunologic: Negative for environmental allergies.   Neurological: Negative for headaches.       Objective   Physical Exam  Vitals and nursing note reviewed.   Constitutional:       Appearance: She is well-developed.   HENT:      Head: Normocephalic.      Right Ear: Tympanic membrane, ear canal and external ear normal.      Left Ear: Tympanic membrane, ear canal and external  ear normal.      Nose: Nose normal.      Mouth/Throat:      Lips: Pink.      Mouth: Mucous membranes are moist. No injury.      Dentition: Normal dentition.      Tongue: No lesions.      Palate: No mass.      Pharynx: No pharyngeal swelling, oropharyngeal exudate, posterior oropharyngeal erythema or uvula swelling.      Tonsils: No tonsillar exudate or tonsillar abscesses.     Eyes:      General: Lids are normal.      Conjunctiva/sclera: Conjunctivae normal.      Pupils: Pupils are equal, round, and reactive to light.   Neck:      Thyroid: No thyroid mass or thyromegaly.      Vascular: No carotid bruit.      Trachea: Trachea normal.   Cardiovascular:      Rate and Rhythm: Normal rate and regular rhythm.      Heart sounds: No murmur heard.  Pulmonary:      Effort: Pulmonary effort is normal. No respiratory distress.      Breath sounds: Normal breath sounds. No decreased breath sounds, wheezing, rhonchi or rales.   Chest:      Chest wall: No tenderness.   Abdominal:      General: Bowel sounds are normal.      Palpations: Abdomen is soft.      Tenderness: There is no abdominal tenderness.   Musculoskeletal:         General: Normal range of motion.      Cervical back: Normal range of motion and neck supple.   Lymphadenopathy:      Head:      Right side of head: Submandibular adenopathy present.      Left side of head: Submandibular adenopathy present.   Skin:     General: Skin is warm and dry.   Neurological:      Mental Status: She is alert and oriented to person, place, and time.   Psychiatric:         Behavior: Behavior normal.         Assessment & Plan   Diagnoses and all orders for this visit:    1. Cough (Primary)  -     ASHLY FLU + SARS PCR  -     benzonatate (Tessalon Perles) 100 MG capsule; Take 1 capsule by mouth 3 (Three) Times a Day As Needed for Cough.  Dispense: 15 capsule; Refill: 1    2. Allergic rhinitis, unspecified seasonality, unspecified trigger  -     methylPREDNISolone (MEDROL) 4 MG dose pack; Take  as directed on package instructions.  Dispense: 21 each; Refill: 0                   Current Outpatient Medications:   •  benzonatate (Tessalon Perles) 100 MG capsule, Take 1 capsule by mouth 3 (Three) Times a Day As Needed for Cough., Disp: 15 capsule, Rfl: 1  •  methylPREDNISolone (MEDROL) 4 MG dose pack, Take as directed on package instructions., Disp: 21 each, Rfl: 0    Return if symptoms worsen or fail to improve, for Recheck.   Recent Results (from the past 168 hour(s))   ASHLY FLU + SARS PCR    Collection Time: 05/27/22 12:51 PM    Specimen: Nasal Cavity; Swab   Result Value Ref Range    COVID19 Not Detected Not Detected - Ref. Range    Influenza A Antigen LUIS M Not Detected Not Detected    Influenza B Antigen LUIS M Not Detected Not Detected    Internal Control Passed Passed    Lot Number [id number]     Expiration Date 02/19/2023

## 2022-06-03 NOTE — PROGRESS NOTES
6/3/2022    Patient: Adrianne Hunter. YOB: 1981   Date of Visit: 6/3/2022     Felipe Beltran NP  Via     Dear Felipe Beltran NP,      Thank you for referring Mr. Tylor Chauhan to [address] for evaluation. My notes for this consultation are attached. If you have questions, please do not hesitate to call me. I look forward to following your patient along with you.       Sincerely,    Mandi Ferguson MD Chief Complaint  Dizziness (Patient stated she woke up with dizzy spells on Saturday morning, stated that it was constant on Saturday and Sunday, today it has been better. If she leans, looks or lays on her left side she feels like the dizziness starts, she has been having left ear pressure. She stated that is she bends over and stands up it occurs but that is common for her. She also has had a hx of sciatic pain, and it is flaring up currently. )    Stephanie Quintanilla presents to [facility]      Subjective   Patient presents to clinic for evaluation of dizziness.  She states that she has been having dizziness for the past 2 days.  The dizziness is worse with certain positions (leaning forward or turning head to the left).  This is associated with some ear pressure on her left ear.  She is not had any syncopal episodes.  She is never been diagnosed with vertigo in the past.  She denies any fevers or chills.  She does occasionally get nauseous when she is feeling dizzy.    The following portions of the patient's history were reviewed and updated as appropriate: allergies, current medications, past family history, past medical history, past social history, past surgical history and problem list.      Health Maintenance   Topic Date Due   • TDAP/TD VACCINES (2 - Td or Tdap) 01/01/2016   • ANNUAL PHYSICAL  06/02/2019   • PAP SMEAR  10/01/2020   • LIPID PANEL  12/13/2020   • INFLUENZA VACCINE  08/01/2021   • COVID-19 Vaccine (3 - Booster for Pfizer series) 09/09/2021   • HEPATITIS C SCREENING  Completed   • Pneumococcal Vaccine 0-64  Aged Out         Procedures       Past Medical History:   Diagnosis Date   • Pap smear for cervical cancer screening 10/2017    normal per pt report      Allergies   Allergen Reactions   • Codeine Anaphylaxis      Social History     Tobacco Use   • Smoking status: Never Smoker   • Smokeless tobacco: Never Used   Vaping Use   • Vaping Use: Never used    Substance Use Topics   • Alcohol use: Yes     Alcohol/week: 2.0 standard drinks     Types: 2 Glasses of wine per week     Comment: socially   • Drug use: No     Past Surgical History:   Procedure Laterality Date   • KNEE SURGERY Right 2005?    sports related trauma    • WISDOM TOOTH EXTRACTION  2004?      Family History   Problem Relation Age of Onset   • Atrial fibrillation Mother         pacemaker   • Heart disease Maternal Uncle    • Heart attack Maternal Grandfather 65   • No Known Problems Father    • Anxiety disorder Sister    • Subarachnoid hemorrhage Maternal Grandmother         ANTICOAGULATION AND FALL   • Lung cancer Paternal Grandfather          Current Outpatient Medications:   •  drospirenone-ethinyl estradiol (SRIRAM,OCELLA) 3-0.03 MG per tablet, Take 1 tablet by mouth Daily., Disp: 84 tablet, Rfl: 0  •  docusate sodium 100 MG capsule, Take 1 capsule by mouth 2 (Two) Times a Day As Needed for Constipation., Disp: 60 capsule, Rfl: 1  •  etonogestrel-ethinyl estradiol (NUVARING) 0.12-0.015 MG/24HR vaginal ring, One per vagina monthly (leave in for 3 weeks and remove for the fourth week) then repeat (Patient taking differently: One per vagina monthly (leave in for 3 weeks and remove for the fourth week) then repeat), Disp: 3 each, Rfl: 4  •  ibuprofen (ADVIL,MOTRIN) 600 MG tablet, Take 1 tablet by mouth Every 6 (Six) Hours As Needed for Mild Pain ., Disp: 60 tablet, Rfl: 1  •  methylPREDNISolone (MEDROL) 4 MG dose pack, Take as directed on package instructions., Disp: 21 each, Rfl: 0  •  nitrofurantoin, macrocrystal-monohydrate, (MACROBID) 100 MG capsule, Take 1 capsule by mouth 2 (two) times a day for 7 days, Disp: 14 capsule, Rfl: 0  •  ondansetron ODT (Zofran ODT) 4 MG disintegrating tablet, Place 1 tablet on the tongue Every 8 (Eight) Hours As Needed for Nausea or Vomiting., Disp: 10 tablet, Rfl: 0  •  Prenatal Vit-Fe Fumarate-FA (Prenatal 27-1) 27-1 MG tablet tablet, Take 1 tablet by mouth Daily.,  Disp: , Rfl:     Objective   Vital Signs  /80   Pulse 72   Temp 97.3 °F (36.3 °C)   Wt 91.7 kg (202 lb 3.2 oz)   SpO2 99%   BMI 31.67 kg/m²   Body mass index is 31.67 kg/m².     Physical Exam  Vitals and nursing note reviewed.   Constitutional:       General: She is not in acute distress.     Appearance: Normal appearance. She is not toxic-appearing.   Eyes:      Extraocular Movements:      Left eye: Nystagmus present.   Cardiovascular:      Rate and Rhythm: Normal rate and regular rhythm.      Heart sounds: Normal heart sounds.   Pulmonary:      Breath sounds: Normal breath sounds.   Abdominal:      General: Bowel sounds are normal.      Palpations: Abdomen is soft.   Skin:     General: Skin is warm and dry.   Neurological:      Mental Status: She is alert and oriented to person, place, and time. Mental status is at baseline.      Cranial Nerves: Cranial nerves are intact.      Coordination: Coordination is intact.      Gait: Gait is intact.   Psychiatric:         Mood and Affect: Mood normal.         Behavior: Behavior normal.          Assessment and Plan  Diagnoses and all orders for this visit:    1. Benign paroxysmal positional vertigo of left ear (Primary)  -     ondansetron ODT (Zofran ODT) 4 MG disintegrating tablet; Place 1 tablet on the tongue Every 8 (Eight) Hours As Needed for Nausea or Vomiting.  Dispense: 10 tablet; Refill: 0  -     methylPREDNISolone (MEDROL) 4 MG dose pack; Take as directed on package instructions.  Dispense: 21 each; Refill: 0  -     Ambulatory Referral to Physical Therapy Vestibular    History and physical seem consistent with BPPV.  Will refer to PT for vestibular therapy.  Zofran as needed for nausea.  I went ahead and gave her a Medrol Dosepak as well.  ED precautions were discussed.        Follow Up    Return if symptoms worsen or fail to improve.    Patient was given instructions and counseling regarding her condition or for health maintenance advice. Please see  specific information pulled into the AVS if appropriate.    Electronically signed by:   Arthur Lerner MD  03/14/2022

## 2022-11-21 ENCOUNTER — OFFICE VISIT (OUTPATIENT)
Dept: INTERNAL MEDICINE | Facility: CLINIC | Age: 32
End: 2022-11-21

## 2022-11-21 VITALS
OXYGEN SATURATION: 100 % | BODY MASS INDEX: 31.61 KG/M2 | RESPIRATION RATE: 14 BRPM | HEIGHT: 67 IN | HEART RATE: 66 BPM | DIASTOLIC BLOOD PRESSURE: 76 MMHG | SYSTOLIC BLOOD PRESSURE: 124 MMHG | TEMPERATURE: 98.5 F | WEIGHT: 201.4 LBS

## 2022-11-21 DIAGNOSIS — Z23 FLU VACCINE NEED: ICD-10-CM

## 2022-11-21 DIAGNOSIS — Z00.00 ANNUAL PHYSICAL EXAM: Primary | ICD-10-CM

## 2022-11-21 DIAGNOSIS — E78.5 DYSLIPIDEMIA: ICD-10-CM

## 2022-11-21 DIAGNOSIS — F41.9 ANXIETY: ICD-10-CM

## 2022-11-21 PROCEDURE — 99395 PREV VISIT EST AGE 18-39: CPT | Performed by: NURSE PRACTITIONER

## 2022-11-21 PROCEDURE — 90686 IIV4 VACC NO PRSV 0.5 ML IM: CPT | Performed by: NURSE PRACTITIONER

## 2022-11-21 PROCEDURE — 99213 OFFICE O/P EST LOW 20 MIN: CPT | Performed by: NURSE PRACTITIONER

## 2022-11-21 PROCEDURE — 90471 IMMUNIZATION ADMIN: CPT | Performed by: NURSE PRACTITIONER

## 2022-11-21 RX ORDER — DIPHENOXYLATE HYDROCHLORIDE AND ATROPINE SULFATE 2.5; .025 MG/1; MG/1
1 TABLET ORAL DAILY
COMMUNITY

## 2022-12-01 ENCOUNTER — TELEPHONE (OUTPATIENT)
Dept: INTERNAL MEDICINE | Facility: CLINIC | Age: 32
End: 2022-12-01

## 2022-12-01 NOTE — TELEPHONE ENCOUNTER
Patient called regarding status of referral from 11/21/22.  Patient stated that she was not in a 'good spot' with her mental health and needed that appointment as soon as possible.    She asked for a return call today if possible.

## 2023-01-23 ENCOUNTER — LAB (OUTPATIENT)
Dept: INTERNAL MEDICINE | Facility: CLINIC | Age: 33
End: 2023-01-23
Payer: COMMERCIAL

## 2023-01-23 DIAGNOSIS — Z00.00 ANNUAL PHYSICAL EXAM: ICD-10-CM

## 2023-01-23 DIAGNOSIS — E78.5 DYSLIPIDEMIA: ICD-10-CM

## 2023-01-23 LAB
ALBUMIN SERPL-MCNC: 4.5 G/DL (ref 3.5–5.2)
ALBUMIN/GLOB SERPL: 1.8 G/DL
ALP SERPL-CCNC: 80 U/L (ref 39–117)
ALT SERPL W P-5'-P-CCNC: 19 U/L (ref 1–33)
ANION GAP SERPL CALCULATED.3IONS-SCNC: 12.6 MMOL/L (ref 5–15)
AST SERPL-CCNC: 18 U/L (ref 1–32)
BILIRUB SERPL-MCNC: 0.4 MG/DL (ref 0–1.2)
BUN SERPL-MCNC: 10 MG/DL (ref 6–20)
BUN/CREAT SERPL: 12.2 (ref 7–25)
CALCIUM SPEC-SCNC: 8.9 MG/DL (ref 8.6–10.5)
CHLORIDE SERPL-SCNC: 103 MMOL/L (ref 98–107)
CHOLEST SERPL-MCNC: 190 MG/DL (ref 0–200)
CO2 SERPL-SCNC: 25.4 MMOL/L (ref 22–29)
CREAT SERPL-MCNC: 0.82 MG/DL (ref 0.57–1)
DEPRECATED RDW RBC AUTO: 38.4 FL (ref 37–54)
EGFRCR SERPLBLD CKD-EPI 2021: 96.4 ML/MIN/1.73
ERYTHROCYTE [DISTWIDTH] IN BLOOD BY AUTOMATED COUNT: 11.9 % (ref 12.3–15.4)
GLOBULIN UR ELPH-MCNC: 2.5 GM/DL
GLUCOSE SERPL-MCNC: 86 MG/DL (ref 65–99)
HCT VFR BLD AUTO: 40.1 % (ref 34–46.6)
HDLC SERPL-MCNC: 46 MG/DL (ref 40–60)
HGB BLD-MCNC: 13.3 G/DL (ref 12–15.9)
LDLC SERPL CALC-MCNC: 133 MG/DL (ref 0–100)
LDLC/HDLC SERPL: 2.88 {RATIO}
MCH RBC QN AUTO: 29.6 PG (ref 26.6–33)
MCHC RBC AUTO-ENTMCNC: 33.2 G/DL (ref 31.5–35.7)
MCV RBC AUTO: 89.3 FL (ref 79–97)
PLATELET # BLD AUTO: 286 10*3/MM3 (ref 140–450)
PMV BLD AUTO: 11.7 FL (ref 6–12)
POTASSIUM SERPL-SCNC: 3.8 MMOL/L (ref 3.5–5.2)
PROT SERPL-MCNC: 7 G/DL (ref 6–8.5)
RBC # BLD AUTO: 4.49 10*6/MM3 (ref 3.77–5.28)
SODIUM SERPL-SCNC: 141 MMOL/L (ref 136–145)
TRIGL SERPL-MCNC: 58 MG/DL (ref 0–150)
TSH SERPL DL<=0.05 MIU/L-ACNC: 1.41 UIU/ML (ref 0.27–4.2)
VLDLC SERPL-MCNC: 11 MG/DL (ref 5–40)
WBC NRBC COR # BLD: 7.22 10*3/MM3 (ref 3.4–10.8)

## 2023-01-23 PROCEDURE — 80050 GENERAL HEALTH PANEL: CPT | Performed by: NURSE PRACTITIONER

## 2023-01-23 PROCEDURE — 80061 LIPID PANEL: CPT | Performed by: NURSE PRACTITIONER

## 2023-01-23 PROCEDURE — 36415 COLL VENOUS BLD VENIPUNCTURE: CPT | Performed by: NURSE PRACTITIONER

## 2023-05-17 ENCOUNTER — TRANSCRIBE ORDERS (OUTPATIENT)
Dept: LAB | Facility: HOSPITAL | Age: 33
End: 2023-05-17
Payer: COMMERCIAL

## 2023-05-17 ENCOUNTER — LAB (OUTPATIENT)
Dept: LAB | Facility: HOSPITAL | Age: 33
End: 2023-05-17
Payer: COMMERCIAL

## 2023-05-17 DIAGNOSIS — O02.1 MISSED ABORTION: ICD-10-CM

## 2023-05-17 DIAGNOSIS — O02.1 MISSED ABORTION: Primary | ICD-10-CM

## 2023-05-17 LAB
ABO GROUP BLD: NORMAL
DEPRECATED RDW RBC AUTO: 40.1 FL (ref 37–54)
ERYTHROCYTE [DISTWIDTH] IN BLOOD BY AUTOMATED COUNT: 12.1 % (ref 12.3–15.4)
HCT VFR BLD AUTO: 38 % (ref 34–46.6)
HGB BLD-MCNC: 13 G/DL (ref 12–15.9)
MCH RBC QN AUTO: 31.3 PG (ref 26.6–33)
MCHC RBC AUTO-ENTMCNC: 34.2 G/DL (ref 31.5–35.7)
MCV RBC AUTO: 91.3 FL (ref 79–97)
PLATELET # BLD AUTO: 248 10*3/MM3 (ref 140–450)
PMV BLD AUTO: 12.4 FL (ref 6–12)
RBC # BLD AUTO: 4.16 10*6/MM3 (ref 3.77–5.28)
RH BLD: POSITIVE
WBC NRBC COR # BLD: 7.98 10*3/MM3 (ref 3.4–10.8)

## 2023-05-17 PROCEDURE — 86901 BLOOD TYPING SEROLOGIC RH(D): CPT

## 2023-05-17 PROCEDURE — 86900 BLOOD TYPING SEROLOGIC ABO: CPT

## 2023-05-17 PROCEDURE — 85027 COMPLETE CBC AUTOMATED: CPT

## 2023-05-17 PROCEDURE — 36415 COLL VENOUS BLD VENIPUNCTURE: CPT

## 2023-05-20 ENCOUNTER — PREP FOR SURGERY (OUTPATIENT)
Dept: OTHER | Facility: HOSPITAL | Age: 33
End: 2023-05-20
Payer: COMMERCIAL

## 2023-05-20 DIAGNOSIS — O02.1 MISSED ABORTION: Primary | ICD-10-CM

## 2023-05-20 RX ORDER — SODIUM CHLORIDE 9 MG/ML
40 INJECTION, SOLUTION INTRAVENOUS AS NEEDED
Status: CANCELLED | OUTPATIENT
Start: 2023-05-20

## 2023-05-20 RX ORDER — DOXYCYCLINE 100 MG/1
200 CAPSULE ORAL ONCE
Status: CANCELLED | OUTPATIENT
Start: 2023-05-20 | End: 2023-05-20

## 2023-05-20 RX ORDER — SODIUM CHLORIDE 0.9 % (FLUSH) 0.9 %
10 SYRINGE (ML) INJECTION AS NEEDED
Status: CANCELLED | OUTPATIENT
Start: 2023-05-20

## 2023-05-20 RX ORDER — SODIUM CHLORIDE 0.9 % (FLUSH) 0.9 %
3 SYRINGE (ML) INJECTION EVERY 12 HOURS SCHEDULED
Status: CANCELLED | OUTPATIENT
Start: 2023-05-20

## 2023-05-22 ENCOUNTER — ANESTHESIA (OUTPATIENT)
Dept: PERIOP | Facility: HOSPITAL | Age: 33
End: 2023-05-22
Payer: COMMERCIAL

## 2023-05-22 ENCOUNTER — ANESTHESIA EVENT (OUTPATIENT)
Dept: PERIOP | Facility: HOSPITAL | Age: 33
End: 2023-05-22
Payer: COMMERCIAL

## 2023-05-22 ENCOUNTER — HOSPITAL ENCOUNTER (OUTPATIENT)
Facility: HOSPITAL | Age: 33
Discharge: HOME | End: 2023-05-22
Attending: OBSTETRICS & GYNECOLOGY | Admitting: OBSTETRICS & GYNECOLOGY
Payer: COMMERCIAL

## 2023-05-22 VITALS
HEIGHT: 66 IN | BODY MASS INDEX: 32.78 KG/M2 | TEMPERATURE: 98.2 F | OXYGEN SATURATION: 97 % | HEART RATE: 70 BPM | WEIGHT: 204 LBS | DIASTOLIC BLOOD PRESSURE: 72 MMHG | SYSTOLIC BLOOD PRESSURE: 107 MMHG | RESPIRATION RATE: 20 BRPM

## 2023-05-22 DIAGNOSIS — Z98.890 S/P DILATION AND CURETTAGE: Primary | ICD-10-CM

## 2023-05-22 DIAGNOSIS — O02.1 MISSED ABORTION: ICD-10-CM

## 2023-05-22 PROCEDURE — 25010000002 ONDANSETRON PER 1 MG

## 2023-05-22 PROCEDURE — 88305 TISSUE EXAM BY PATHOLOGIST: CPT | Performed by: OBSTETRICS & GYNECOLOGY

## 2023-05-22 PROCEDURE — 25010000002 PROPOFOL 10 MG/ML EMULSION

## 2023-05-22 PROCEDURE — 25010000002 ONDANSETRON PER 1 MG: Performed by: OBSTETRICS & GYNECOLOGY

## 2023-05-22 PROCEDURE — 25010000002 DEXAMETHASONE PER 1 MG

## 2023-05-22 RX ORDER — IBUPROFEN 600 MG/1
600 TABLET ORAL EVERY 6 HOURS PRN
Qty: 10 TABLET | Refills: 0 | Status: SHIPPED | OUTPATIENT
Start: 2023-05-22

## 2023-05-22 RX ORDER — FENTANYL CITRATE 50 UG/ML
50 INJECTION, SOLUTION INTRAMUSCULAR; INTRAVENOUS
Status: DISCONTINUED | OUTPATIENT
Start: 2023-05-22 | End: 2023-05-22 | Stop reason: HOSPADM

## 2023-05-22 RX ORDER — SODIUM CHLORIDE 0.9 % (FLUSH) 0.9 %
10 SYRINGE (ML) INJECTION EVERY 12 HOURS SCHEDULED
Status: DISCONTINUED | OUTPATIENT
Start: 2023-05-22 | End: 2023-05-22 | Stop reason: HOSPADM

## 2023-05-22 RX ORDER — LIDOCAINE HYDROCHLORIDE 10 MG/ML
INJECTION, SOLUTION EPIDURAL; INFILTRATION; INTRACAUDAL; PERINEURAL AS NEEDED
Status: DISCONTINUED | OUTPATIENT
Start: 2023-05-22 | End: 2023-05-22 | Stop reason: SURG

## 2023-05-22 RX ORDER — SODIUM CHLORIDE, SODIUM LACTATE, POTASSIUM CHLORIDE, CALCIUM CHLORIDE 600; 310; 30; 20 MG/100ML; MG/100ML; MG/100ML; MG/100ML
100 INJECTION, SOLUTION INTRAVENOUS CONTINUOUS
Status: DISCONTINUED | OUTPATIENT
Start: 2023-05-22 | End: 2023-05-22 | Stop reason: HOSPADM

## 2023-05-22 RX ORDER — ONDANSETRON 2 MG/ML
4 INJECTION INTRAMUSCULAR; INTRAVENOUS ONCE AS NEEDED
Status: DISCONTINUED | OUTPATIENT
Start: 2023-05-22 | End: 2023-05-22 | Stop reason: HOSPADM

## 2023-05-22 RX ORDER — HYDROCODONE BITARTRATE AND ACETAMINOPHEN 5; 325 MG/1; MG/1
TABLET ORAL
Status: COMPLETED
Start: 2023-05-22 | End: 2023-05-22

## 2023-05-22 RX ORDER — FAMOTIDINE 20 MG/1
20 TABLET, FILM COATED ORAL ONCE
Status: COMPLETED | OUTPATIENT
Start: 2023-05-22 | End: 2023-05-22

## 2023-05-22 RX ORDER — MIDAZOLAM HYDROCHLORIDE 1 MG/ML
1 INJECTION INTRAMUSCULAR; INTRAVENOUS
Status: DISCONTINUED | OUTPATIENT
Start: 2023-05-22 | End: 2023-05-22 | Stop reason: HOSPADM

## 2023-05-22 RX ORDER — EPHEDRINE SULFATE 50 MG/ML
5 INJECTION, SOLUTION INTRAVENOUS ONCE AS NEEDED
Status: DISCONTINUED | OUTPATIENT
Start: 2023-05-22 | End: 2023-05-22 | Stop reason: HOSPADM

## 2023-05-22 RX ORDER — HYDROCODONE BITARTRATE AND ACETAMINOPHEN 5; 325 MG/1; MG/1
1 TABLET ORAL EVERY 6 HOURS PRN
Qty: 3 TABLET | Refills: 0 | Status: SHIPPED | OUTPATIENT
Start: 2023-05-22

## 2023-05-22 RX ORDER — DEXAMETHASONE SODIUM PHOSPHATE 4 MG/ML
INJECTION, SOLUTION INTRA-ARTICULAR; INTRALESIONAL; INTRAMUSCULAR; INTRAVENOUS; SOFT TISSUE AS NEEDED
Status: DISCONTINUED | OUTPATIENT
Start: 2023-05-22 | End: 2023-05-22 | Stop reason: SURG

## 2023-05-22 RX ORDER — MEPERIDINE HYDROCHLORIDE 25 MG/ML
12.5 INJECTION INTRAMUSCULAR; INTRAVENOUS; SUBCUTANEOUS
Status: DISCONTINUED | OUTPATIENT
Start: 2023-05-22 | End: 2023-05-22 | Stop reason: HOSPADM

## 2023-05-22 RX ORDER — OXYCODONE HYDROCHLORIDE AND ACETAMINOPHEN 5; 325 MG/1; MG/1
1 TABLET ORAL ONCE AS NEEDED
Status: DISCONTINUED | OUTPATIENT
Start: 2023-05-22 | End: 2023-05-22 | Stop reason: HOSPADM

## 2023-05-22 RX ORDER — SODIUM CHLORIDE 0.9 % (FLUSH) 0.9 %
10 SYRINGE (ML) INJECTION AS NEEDED
Status: DISCONTINUED | OUTPATIENT
Start: 2023-05-22 | End: 2023-05-22 | Stop reason: HOSPADM

## 2023-05-22 RX ORDER — ONDANSETRON 2 MG/ML
4 INJECTION INTRAMUSCULAR; INTRAVENOUS ONCE
Status: COMPLETED | OUTPATIENT
Start: 2023-05-22 | End: 2023-05-22

## 2023-05-22 RX ORDER — MAGNESIUM HYDROXIDE 1200 MG/15ML
LIQUID ORAL AS NEEDED
Status: DISCONTINUED | OUTPATIENT
Start: 2023-05-22 | End: 2023-05-22 | Stop reason: HOSPADM

## 2023-05-22 RX ORDER — LIDOCAINE HYDROCHLORIDE 10 MG/ML
0.5 INJECTION, SOLUTION EPIDURAL; INFILTRATION; INTRACAUDAL; PERINEURAL ONCE AS NEEDED
Status: COMPLETED | OUTPATIENT
Start: 2023-05-22 | End: 2023-05-22

## 2023-05-22 RX ORDER — FAMOTIDINE 10 MG/ML
20 INJECTION, SOLUTION INTRAVENOUS ONCE
Status: CANCELLED | OUTPATIENT
Start: 2023-05-22 | End: 2023-05-22

## 2023-05-22 RX ORDER — SODIUM CHLORIDE, SODIUM LACTATE, POTASSIUM CHLORIDE, CALCIUM CHLORIDE 600; 310; 30; 20 MG/100ML; MG/100ML; MG/100ML; MG/100ML
9 INJECTION, SOLUTION INTRAVENOUS CONTINUOUS
Status: DISCONTINUED | OUTPATIENT
Start: 2023-05-22 | End: 2023-05-22 | Stop reason: HOSPADM

## 2023-05-22 RX ORDER — SODIUM CHLORIDE 9 MG/ML
40 INJECTION, SOLUTION INTRAVENOUS AS NEEDED
Status: DISCONTINUED | OUTPATIENT
Start: 2023-05-22 | End: 2023-05-22 | Stop reason: HOSPADM

## 2023-05-22 RX ORDER — PROPOFOL 10 MG/ML
VIAL (ML) INTRAVENOUS AS NEEDED
Status: DISCONTINUED | OUTPATIENT
Start: 2023-05-22 | End: 2023-05-22 | Stop reason: SURG

## 2023-05-22 RX ORDER — DOCUSATE SODIUM 100 MG/1
100 CAPSULE, LIQUID FILLED ORAL 2 TIMES DAILY
Qty: 60 CAPSULE | Refills: 1 | Status: SHIPPED | OUTPATIENT
Start: 2023-05-22

## 2023-05-22 RX ORDER — NALOXONE HCL 0.4 MG/ML
0.4 VIAL (ML) INJECTION AS NEEDED
Status: DISCONTINUED | OUTPATIENT
Start: 2023-05-22 | End: 2023-05-22 | Stop reason: HOSPADM

## 2023-05-22 RX ORDER — ONDANSETRON 2 MG/ML
4 INJECTION INTRAMUSCULAR; INTRAVENOUS ONCE AS NEEDED
Status: DISCONTINUED | OUTPATIENT
Start: 2023-05-22 | End: 2023-05-22 | Stop reason: SDUPTHER

## 2023-05-22 RX ORDER — ONDANSETRON 2 MG/ML
INJECTION INTRAMUSCULAR; INTRAVENOUS AS NEEDED
Status: DISCONTINUED | OUTPATIENT
Start: 2023-05-22 | End: 2023-05-22 | Stop reason: SURG

## 2023-05-22 RX ORDER — DOXYCYCLINE 100 MG/1
200 CAPSULE ORAL ONCE
Status: COMPLETED | OUTPATIENT
Start: 2023-05-22 | End: 2023-05-22

## 2023-05-22 RX ADMIN — ONDANSETRON 4 MG: 2 INJECTION INTRAMUSCULAR; INTRAVENOUS at 11:47

## 2023-05-22 RX ADMIN — SODIUM CHLORIDE, POTASSIUM CHLORIDE, SODIUM LACTATE AND CALCIUM CHLORIDE 9 ML/HR: 600; 310; 30; 20 INJECTION, SOLUTION INTRAVENOUS at 11:41

## 2023-05-22 RX ADMIN — DEXAMETHASONE SODIUM PHOSPHATE 4 MG: 4 INJECTION, SOLUTION INTRAMUSCULAR; INTRAVENOUS at 12:20

## 2023-05-22 RX ADMIN — ONDANSETRON 4 MG: 2 INJECTION INTRAMUSCULAR; INTRAVENOUS at 12:20

## 2023-05-22 RX ADMIN — LIDOCAINE HYDROCHLORIDE 50 MG: 10 INJECTION, SOLUTION EPIDURAL; INFILTRATION; INTRACAUDAL; PERINEURAL at 12:13

## 2023-05-22 RX ADMIN — HYDROCODONE BITARTRATE AND ACETAMINOPHEN 1 TABLET: 5; 325 TABLET ORAL at 14:10

## 2023-05-22 RX ADMIN — PROPOFOL 200 MG: 10 INJECTION, EMULSION INTRAVENOUS at 12:16

## 2023-05-22 RX ADMIN — LIDOCAINE HYDROCHLORIDE 0.5 ML: 10 INJECTION, SOLUTION EPIDURAL; INFILTRATION; INTRACAUDAL; PERINEURAL at 11:41

## 2023-05-22 RX ADMIN — DOXYCYCLINE 200 MG: 100 CAPSULE ORAL at 11:44

## 2023-05-22 RX ADMIN — PROPOFOL 200 MG: 10 INJECTION, EMULSION INTRAVENOUS at 12:13

## 2023-05-22 RX ADMIN — FAMOTIDINE 20 MG: 20 TABLET, FILM COATED ORAL at 11:44

## 2023-05-22 NOTE — INTERVAL H&P NOTE
"[facility] Pre-op    Full history and physical note from office is attached.    VS: /70  HR 62  RR 16  T 97.8  Sat 99%RA  Ht 167.6 cm (66\")   Wt 92.5 kg (204 lb)   BMI 32.93 kg/m²     Immunizations:  Influenza:    Pneumococcal:  No  Tetanus:  UTD  Covid : x2      LAB Results:  Lab Results   Component Value Date    WBC 7.98 2023    HGB 13.0 2023    HCT 38.0 2023    MCV 91.3 2023     2023    NEUTROABS 9.76 (H) 2021    GLUCOSE 86 2023    BUN 10 2023    CREATININE 0.82 2023    EGFRIFNONA 84 2018    EGFRIFAFRI 102 2018     2023    K 3.8 2023     2023    CO2 25.4 2023    CALCIUM 8.9 2023    ALBUMIN 4.5 2023    AST 18 2023    ALT 19 2023    BILITOT 0.4 2023       Cancer Staging (if applicable)  Cancer Patient: __ yes __no __unknown__N/A; If yes, clinical stage T:__ N:__M:__, stage group or __N/A      Impression: Missed       Plan: SUCTION DILATATION AND CURETTAGE      Kelly Mullins, WILSON   2023   11:10 EDT   "

## 2023-05-22 NOTE — ANESTHESIA PREPROCEDURE EVALUATION
Anesthesia Evaluation     Patient summary reviewed and Nursing notes reviewed   no history of anesthetic complications:  NPO Solid Status: > 8 hours  NPO Liquid Status: > 2 hours           Airway   Mallampati: I  TM distance: >3 FB  Neck ROM: full  No difficulty expected  Dental - normal exam     Pulmonary - normal exam   (-) not a smoker  Cardiovascular - normal exam    (+) hyperlipidemia,       Neuro/Psych  (-) seizures, CVA  GI/Hepatic/Renal/Endo    (+) obesity,       Musculoskeletal     Abdominal    Substance History      OB/GYN      Comment: MAB      Other   arthritis,                    Anesthesia Plan    ASA 2     general     intravenous induction     Anesthetic plan, risks, benefits, and alternatives have been provided, discussed and informed consent has been obtained with: patient.    Use of blood products discussed with consented to blood products.   Plan discussed with CRNA.        CODE STATUS:    Code Status (Patient has no pulse and is not breathing): CPR (Attempt to Resuscitate)  Medical Interventions (Patient has pulse or is breathing): Full Support  Release to patient: Routine Release

## 2023-05-22 NOTE — ANESTHESIA POSTPROCEDURE EVALUATION
Patient: Stephanie Quintanilla    Procedure Summary     Date: 05/22/23 Room / Location:  [facility]  /  [facility]    Anesthesia Start: 1210 Anesthesia Stop: 1242    Procedure: SUCTION DILATATION AND CURETTAGE (Vagina) Diagnosis:     Surgeons: Radha Beckford MD Provider: Topher Ramires Jr., MD    Anesthesia Type: general ASA Status: 2          Anesthesia Type: general    Vitals  Vitals Value Taken Time   /65 05/22/23 1244   Temp 97.5 °F (36.4 °C) 05/22/23 1244   Pulse 74 05/22/23 1244   Resp 15 05/22/23 1244   SpO2 100 % 05/22/23 1244           Post Anesthesia Care and Evaluation    Patient location during evaluation: PACU  Patient participation: complete - patient participated  Level of consciousness: awake and alert  Pain score: 0  Pain management: adequate    Airway patency: patent  Anesthetic complications: No anesthetic complications  PONV Status: none  Cardiovascular status: hemodynamically stable and acceptable  Respiratory status: nonlabored ventilation, acceptable and nasal cannula  Hydration status: acceptable

## 2023-05-22 NOTE — OP NOTE
Suction Dilation and Curettage Procedure Note        Pre-operative Diagnosis: missed , 7wks gestation     Post-operative Diagnosis: same     Operation: suction dilation and curettage     Surgeon: Radha Beckford MD      Assistants: none     Anesthesia: General anesthesia     Findings: 7wk sized midposition uterus pre-procedure, 6wks sized contracted uterus post-procedure; no adnexal masses; copious products of conception     Estimated Blood Loss: 50mL       Specimens: products of conception     Complications:  None     Disposition: PACU - hemodynamically stable.     Procedure Details    The patient was seen in the Holding Room. The risks, benefits, complications, treatment options, and expected outcomes were discussed with the patient. The patient concurred with the proposed plan, giving informed consent. The patient was identified as Stephanie Quintanilla  and the procedure verified as suction D&C. A Time Out was held and the above information confirmed.     After induction of anesthesia, the patient was draped and prepped in the usual sterile manner. An exam under anesthesia was performed. Her bladder was drained.  A weighted speculum was placed in the vagina. The anterior lip of the cervix was grasped with a single toothed tenaculum. The cervix was sequentially dilated with Mamie dilators. A size 8 curved suction curette was introduced to the fundus of the uterus. Multiple passes with the curette were made until no further products of conception were obtained. A gentle sharp curettage was then performed. The suction curette was then reintroduced to obtain any loosed material. Uterine cry was felt circumferentially. The tenaculum and speculum were removed. Hemostasis was noted. Post procedure examination revealed a contracted uterus, 6wks size. The counts were correct x 2. Patient was awakened and taken to recovery in stable condition.     Radha Beckford MD  12:55 EDT

## 2023-05-22 NOTE — ANESTHESIA PROCEDURE NOTES
Airway  Urgency: elective    Date/Time: 5/22/2023 12:17 PM  Airway not difficult    General Information and Staff    Patient location during procedure: OR    Indications and Patient Condition  Indications for airway management: airway protection    Preoxygenated: yes  Mask difficulty assessment: 1 - vent by mask    Final Airway Details  Final airway type: supraglottic airway      Successful airway: I-gel  Size 4     Number of attempts at approach: 1  Assessment: lips, teeth, and gum same as pre-op    Additional Comments  LMA placed without difficulty, ventilation with assist, equal breath sounds and symmetric chest rise and fall

## 2023-05-23 LAB
CYTO UR: NORMAL
LAB AP CASE REPORT: NORMAL
LAB AP CLINICAL INFORMATION: NORMAL
PATH REPORT.FINAL DX SPEC: NORMAL
PATH REPORT.GROSS SPEC: NORMAL

## 2023-06-01 ENCOUNTER — TELEPHONE (OUTPATIENT)
Dept: LABOR AND DELIVERY | Facility: HOSPITAL | Age: 33
End: 2023-06-01

## 2023-06-01 NOTE — TELEPHONE ENCOUNTER
Spoke with Stephanie briefly, she states she has returned to work and that has been very helpful. States she received grief support information while in the hospital and would like to receive invitation to [facility] support group and more information regarding counseling services (email to be sent after conversation). States it is OK to send survey information in the mail. I encouraged Stephanie to contact [facility] program if she has questions or concerns in the future.

## 2023-08-18 ENCOUNTER — LAB (OUTPATIENT)
Dept: INTERNAL MEDICINE | Facility: CLINIC | Age: 33
End: 2023-08-18
Payer: COMMERCIAL

## 2023-08-18 ENCOUNTER — OFFICE VISIT (OUTPATIENT)
Dept: INTERNAL MEDICINE | Facility: CLINIC | Age: 33
End: 2023-08-18
Payer: COMMERCIAL

## 2023-08-18 VITALS
WEIGHT: 202 LBS | HEART RATE: 59 BPM | BODY MASS INDEX: 32.47 KG/M2 | DIASTOLIC BLOOD PRESSURE: 74 MMHG | HEIGHT: 66 IN | SYSTOLIC BLOOD PRESSURE: 110 MMHG | TEMPERATURE: 98.2 F | OXYGEN SATURATION: 98 %

## 2023-08-18 DIAGNOSIS — E78.5 DYSLIPIDEMIA: ICD-10-CM

## 2023-08-18 DIAGNOSIS — R07.89 CHEST DISCOMFORT: ICD-10-CM

## 2023-08-18 DIAGNOSIS — R73.09 ABNORMAL GLUCOSE: ICD-10-CM

## 2023-08-18 DIAGNOSIS — R55 NEAR SYNCOPE: ICD-10-CM

## 2023-08-18 DIAGNOSIS — R55 NEAR SYNCOPE: Primary | ICD-10-CM

## 2023-08-18 LAB
BASOPHILS # BLD AUTO: 0.05 10*3/MM3 (ref 0–0.2)
BASOPHILS NFR BLD AUTO: 0.6 % (ref 0–1.5)
DEPRECATED RDW RBC AUTO: 40.2 FL (ref 37–54)
EOSINOPHIL # BLD AUTO: 0.04 10*3/MM3 (ref 0–0.4)
EOSINOPHIL NFR BLD AUTO: 0.5 % (ref 0.3–6.2)
ERYTHROCYTE [DISTWIDTH] IN BLOOD BY AUTOMATED COUNT: 12.3 % (ref 12.3–15.4)
HBA1C MFR BLD: 5.3 % (ref 4.8–5.6)
HCT VFR BLD AUTO: 40.4 % (ref 34–46.6)
HGB BLD-MCNC: 13.8 G/DL (ref 12–15.9)
IMM GRANULOCYTES # BLD AUTO: 0.02 10*3/MM3 (ref 0–0.05)
IMM GRANULOCYTES NFR BLD AUTO: 0.2 % (ref 0–0.5)
LYMPHOCYTES # BLD AUTO: 2.75 10*3/MM3 (ref 0.7–3.1)
LYMPHOCYTES NFR BLD AUTO: 31.3 % (ref 19.6–45.3)
MCH RBC QN AUTO: 30.8 PG (ref 26.6–33)
MCHC RBC AUTO-ENTMCNC: 34.2 G/DL (ref 31.5–35.7)
MCV RBC AUTO: 90.2 FL (ref 79–97)
MONOCYTES # BLD AUTO: 0.56 10*3/MM3 (ref 0.1–0.9)
MONOCYTES NFR BLD AUTO: 6.4 % (ref 5–12)
NEUTROPHILS NFR BLD AUTO: 5.38 10*3/MM3 (ref 1.7–7)
NEUTROPHILS NFR BLD AUTO: 61 % (ref 42.7–76)
NRBC BLD AUTO-RTO: 0 /100 WBC (ref 0–0.2)
PLATELET # BLD AUTO: 275 10*3/MM3 (ref 140–450)
PMV BLD AUTO: 12.1 FL (ref 6–12)
RBC # BLD AUTO: 4.48 10*6/MM3 (ref 3.77–5.28)
WBC NRBC COR # BLD: 8.8 10*3/MM3 (ref 3.4–10.8)

## 2023-08-18 PROCEDURE — 83036 HEMOGLOBIN GLYCOSYLATED A1C: CPT | Performed by: FAMILY MEDICINE

## 2023-08-18 PROCEDURE — 36415 COLL VENOUS BLD VENIPUNCTURE: CPT | Performed by: FAMILY MEDICINE

## 2023-08-18 PROCEDURE — 80061 LIPID PANEL: CPT | Performed by: FAMILY MEDICINE

## 2023-08-18 PROCEDURE — 80048 BASIC METABOLIC PNL TOTAL CA: CPT | Performed by: FAMILY MEDICINE

## 2023-08-18 PROCEDURE — 99214 OFFICE O/P EST MOD 30 MIN: CPT | Performed by: FAMILY MEDICINE

## 2023-08-18 PROCEDURE — 85025 COMPLETE CBC W/AUTO DIFF WBC: CPT | Performed by: FAMILY MEDICINE

## 2023-08-18 PROCEDURE — 84443 ASSAY THYROID STIM HORMONE: CPT | Performed by: FAMILY MEDICINE

## 2023-08-18 PROCEDURE — 93000 ELECTROCARDIOGRAM COMPLETE: CPT | Performed by: FAMILY MEDICINE

## 2023-08-18 NOTE — PROGRESS NOTES
"Subjective   Stephanie Quintanilla is a 33 y.o. female.     Chief Complaint   Patient presents with    Syncope     Near syncope episode this morning. Felt like someone punch her in chest.  Still feels weak, tired. Family history of Afib mom side of family       Visit Vitals  /74 (BP Location: Right arm, Patient Position: Sitting, Cuff Size: Adult)   Pulse 59   Temp 98.2 øF (36.8 øC)   Ht 167.6 cm (66\")   Wt 91.6 kg (202 lb)   LMP 08/09/2023 (Approximate)   SpO2 98%   Breastfeeding No   BMI 32.60 kg/mý         Syncope  This is a new (near syncope) problem. The current episode started today. The problem has been resolved. Nothing aggravates the symptoms. Associated symptoms include focal weakness, light-headedness, malaise/fatigue, vertigo (transient) and weakness. Pertinent negatives include no abdominal pain, auditory change (transient drop right side), aura, back pain, bladder incontinence, bowel incontinence, chest pain, clumsiness, confusion, diaphoresis, dizziness, fever, focal sensory loss, headaches, nausea, palpitations, slurred speech, visual change or vomiting. Treatments tried: rest. There is no history of arrhythmia, CAD, a clotting disorder, CVA, DM, HTN, seizures, a sudden death in family, TIA or vertigo.    Pt felt a \"thump\" in the middle of her chest this morning and had had near syncope afterward.  Pt is staying tired today. Pt's Mom has Afib.  Pt had just walked in her her boss's office and was talking to boss and felt the thump. Pt had dion standing about 5 minutes.      Pt had miscarriage in May. Pt has had some panic attacks since the miscarriage.    Pt has had elevated lipid in the past and elevated glucose in the past.   Pt drinks a lot of caffeine. Pt does not use illicit drugs.   The following portions of the patient's history were reviewed and updated as appropriate: allergies, current medications, past family history, past medical history, past social history, past surgical history, " and problem list.    Past Medical History:   Diagnosis Date    Arthritis     knees    History of pneumonia as a child     IBS (irritable bowel syndrome)     gluten free diet    Pap smear for cervical cancer screening 10/2017    normal per pt report      Past Surgical History:   Procedure Laterality Date    D & C WITH SUCTION N/A 5/22/2023    Procedure: SUCTION DILATATION AND CURETTAGE;  Surgeon: Radha Beckford MD;  [facility]: [facility];  Service: Obstetrics/Gynecology;  Laterality: N/A;    KNEE SURGERY Right 2005?    sports related trauma     WISDOM TOOTH EXTRACTION  2004?      Family History   Problem Relation Age of Onset    Atrial fibrillation Mother         pacemaker    No Known Problems Father     Anxiety disorder Sister     Atrial fibrillation Maternal Uncle     Heart disease Maternal Uncle     Subarachnoid hemorrhage Maternal Grandmother         ANTICOAGULATION AND FALL    Heart attack Maternal Grandfather 65    Atrial fibrillation Maternal Grandfather     Lung cancer Paternal Grandfather       Social History     Socioeconomic History    Marital status:    Tobacco Use    Smoking status: Never     Passive exposure: Never    Smokeless tobacco: Never   Vaping Use    Vaping Use: Never used    Passive vaping exposure: Yes   Substance and Sexual Activity    Alcohol use: Not Currently     Comment: once a month    Drug use: No    Sexual activity: Defer     Partners: Male     Birth control/protection: None      Allergies   Allergen Reactions    Codeine Anaphylaxis       Review of Systems   Constitutional:  Positive for malaise/fatigue. Negative for diaphoresis and fever.   Cardiovascular:  Positive for syncope. Negative for chest pain and palpitations.   Gastrointestinal:  Negative for abdominal pain, bowel incontinence, nausea and vomiting.   Genitourinary:  Negative for bladder incontinence.   Musculoskeletal:  Negative for back pain.   Neurological:  Positive for vertigo (transient), focal weakness,  weakness and light-headedness. Negative for dizziness and headaches.   Psychiatric/Behavioral:  Negative for confusion.      Objective   Physical Exam  Vitals and nursing note reviewed.   Constitutional:       Appearance: She is well-developed.   HENT:      Head: Normocephalic.      Right Ear: External ear normal.      Left Ear: External ear normal.      Nose: Nose normal.   Eyes:      General: Lids are normal.      Conjunctiva/sclera: Conjunctivae normal.      Pupils: Pupils are equal, round, and reactive to light.   Neck:      Thyroid: No thyroid mass or thyromegaly.      Vascular: No carotid bruit.      Trachea: Trachea normal.   Cardiovascular:      Rate and Rhythm: Normal rate and regular rhythm.      Heart sounds: No murmur heard.  Pulmonary:      Effort: Pulmonary effort is normal. No respiratory distress.      Breath sounds: Normal breath sounds. No decreased breath sounds, wheezing, rhonchi or rales.   Chest:      Chest wall: No tenderness.   Abdominal:      General: Bowel sounds are normal.      Palpations: Abdomen is soft.      Tenderness: There is no abdominal tenderness.   Musculoskeletal:         General: Normal range of motion.      Cervical back: Normal range of motion and neck supple.   Skin:     General: Skin is warm and dry.   Neurological:      Mental Status: She is alert and oriented to person, place, and time.   Psychiatric:         Behavior: Behavior normal.       Assessment & Plan   Diagnoses and all orders for this visit:    1. Near syncope (Primary)  -     ECG 12 Lead  -     Ambulatory Referral to [facility] [place] [facility]  -     Holter Monitor - 72 Hour Up To 15 Days; Future  -     TSH Rfx On Abnormal To Free T4; Future  -     CBC & Differential; Future  -     Basic Metabolic Panel; Future    2. Chest discomfort  -     ECG 12 Lead  -     Ambulatory Referral to [facility] [place] [facility]  -     Holter Monitor - 72 Hour Up To 15 Days; Future    3. Dyslipidemia  -      Lipid Panel; Future    4. Abnormal glucose  -     Hemoglobin A1c; Future        To ER if symptoms recur.     Hand out on A fib and premature contractions     Decrease caffeine    Current Outpatient Medications:     multivitamin (THERAGRAN) tablet tablet, Take 1 tablet by mouth Daily., Disp: , Rfl:     Return in about 4 weeks (around 9/15/2023), or if symptoms worsen or fail to improve, for Recheck.      ECG 12 Lead    Date/Time: 8/18/2023 2:01 PM  Performed by: Amarilys Fofana MD  Authorized by: Amarilys Fofana MD   Comparison: not compared with previous ECG   Previous ECG: no previous ECG available  Rhythm: sinus rhythm and sinus bradycardia  Rate: bradycardic  BPM: 58  Conduction: conduction normal  Q waves: all    ST Segments: ST segments normal  T Waves: T waves normal  QRS axis: normal (P, R, T: 35, 14, 22)  Other: no other findings    Clinical impression: non-specific ECG  Comments: IN int 176 ms  QRS dur 84 ms  QT/QTc 443/439 ms

## 2023-08-19 LAB
ANION GAP SERPL CALCULATED.3IONS-SCNC: 12 MMOL/L (ref 5–15)
BUN SERPL-MCNC: 9 MG/DL (ref 6–20)
BUN/CREAT SERPL: 10.7 (ref 7–25)
CALCIUM SPEC-SCNC: 9.4 MG/DL (ref 8.6–10.5)
CHLORIDE SERPL-SCNC: 103 MMOL/L (ref 98–107)
CHOLEST SERPL-MCNC: 191 MG/DL (ref 0–200)
CO2 SERPL-SCNC: 25 MMOL/L (ref 22–29)
CREAT SERPL-MCNC: 0.84 MG/DL (ref 0.57–1)
EGFRCR SERPLBLD CKD-EPI 2021: 93.1 ML/MIN/1.73
GLUCOSE SERPL-MCNC: 88 MG/DL (ref 65–99)
HDLC SERPL-MCNC: 54 MG/DL (ref 40–60)
LDLC SERPL CALC-MCNC: 123 MG/DL (ref 0–100)
LDLC/HDLC SERPL: 2.24 {RATIO}
POTASSIUM SERPL-SCNC: 4 MMOL/L (ref 3.5–5.2)
SODIUM SERPL-SCNC: 140 MMOL/L (ref 136–145)
TRIGL SERPL-MCNC: 79 MG/DL (ref 0–150)
TSH SERPL DL<=0.05 MIU/L-ACNC: 1.18 UIU/ML (ref 0.27–4.2)
VLDLC SERPL-MCNC: 14 MG/DL (ref 5–40)

## 2023-09-01 ENCOUNTER — OFFICE VISIT (OUTPATIENT)
Dept: CARDIOLOGY | Facility: HOSPITAL | Age: 33
End: 2023-09-01
Payer: COMMERCIAL

## 2023-09-01 ENCOUNTER — HOSPITAL ENCOUNTER (OUTPATIENT)
Dept: CARDIOLOGY | Facility: HOSPITAL | Age: 33
Discharge: HOME | End: 2023-09-01
Payer: COMMERCIAL

## 2023-09-01 VITALS
OXYGEN SATURATION: 97 % | TEMPERATURE: 96.6 F | DIASTOLIC BLOOD PRESSURE: 64 MMHG | SYSTOLIC BLOOD PRESSURE: 104 MMHG | RESPIRATION RATE: 20 BRPM | HEIGHT: 66 IN | BODY MASS INDEX: 32.59 KG/M2 | WEIGHT: 202.8 LBS | HEART RATE: 59 BPM

## 2023-09-01 DIAGNOSIS — R55 NEAR SYNCOPE: ICD-10-CM

## 2023-09-01 DIAGNOSIS — R55 NEAR SYNCOPE: Primary | ICD-10-CM

## 2023-09-01 DIAGNOSIS — R00.2 PALPITATIONS: ICD-10-CM

## 2023-09-01 DIAGNOSIS — Z82.49 FAMILY HISTORY OF ATRIAL FIBRILLATION: ICD-10-CM

## 2023-09-01 PROCEDURE — 93246 EXT ECG>7D<15D RECORDING: CPT

## 2023-09-01 NOTE — PROGRESS NOTES
"[facility], [facility]    Chief Complaint  Syncope    Subjective    History of Present Illness {CC  Problem List  Visit  Diagnosis   Encounters  Notes  Medications  Labs  Result Review Imaging  Media :23}     Stephanie Quintanilla presents to [facility] for   History of Present Illness     33-year-old female with near syncope.  Associated dizziness, malaise, weakness.    Patient had reported walking into her [employer] office (non-stressful) when she felt a sudden heaviness in chest (5 sec), blurry vision, head rush followed by feeling of near syncope.  She had been standing for approximately 5 minutes.    Pt reports previous episodes with sudden onset dyspnea.  Intermittent palpitations.     No history of known arrhythmias, CAD, clotting disorder, CVA/TIA, diabetes, hypertension, seizures.    Mom with a history of atrial fibrillation, grandmother, uncle with afib.     Miscarriage in May.  Panic attacks since miscarriage. Menstrual cycle is now regular.     Regular caffeine intake. Working to decrease intake.      Objective     Vital Signs:   Vitals:    09/01/23 1051 09/01/23 1052 09/01/23 1053   BP: 119/63 112/73 104/64   BP Location: Right arm Left arm Left arm   Patient Position: Sitting Standing Sitting   Cuff Size: Adult Adult Adult   Pulse: 59 69 59   Resp:   20   Temp: 96.6 øF (35.9 øC) 96.6 øF (35.9 øC) 96.6 øF (35.9 øC)   TempSrc: Temporal Temporal Temporal   SpO2: 98% 99% 97%   Weight:   92 kg (202 lb 12.8 oz)   Height:   167.6 cm (66\")     Body mass index is 32.73 kg/mý.  Physical Exam  Vitals reviewed.   Constitutional:       General: She is not in acute distress.     Appearance: Normal appearance.   Neck:      Vascular: No carotid bruit.   Cardiovascular:      Rate and Rhythm: Normal rate and regular rhythm.      Pulses:           Radial pulses are 2+ on the right side and 2+ on the left side.        Dorsalis pedis " pulses are 2+ on the right side and 2+ on the left side.        Posterior tibial pulses are 2+ on the right side and 2+ on the left side.      Heart sounds: Normal heart sounds. No murmur heard.  Pulmonary:      Effort: Pulmonary effort is normal.      Breath sounds: Normal breath sounds.   Musculoskeletal:      Right lower leg: No edema.      Left lower leg: No edema.   Skin:     General: Skin is warm and dry.   Neurological:      Mental Status: She is alert.   Psychiatric:         Mood and Affect: Mood normal.         Behavior: Behavior is cooperative.            Result Review  Data Reviewed:{ Labs  Result Review  Imaging  Med Tab  Media :23}     EKG 8/18/2023: Sinus bradycardia 58 bpm    Lab on 08/18/2023   Component Date Value Ref Range Status    TSH 08/18/2023 1.180  0.270 - 4.200 uIU/mL Final    Glucose 08/18/2023 88  65 - 99 mg/dL Final    BUN 08/18/2023 9  6 - 20 mg/dL Final    Creatinine 08/18/2023 0.84  0.57 - 1.00 mg/dL Final    Sodium 08/18/2023 140  136 - 145 mmol/L Final    Potassium 08/18/2023 4.0  3.5 - 5.2 mmol/L Final    Chloride 08/18/2023 103  98 - 107 mmol/L Final    CO2 08/18/2023 25.0  22.0 - 29.0 mmol/L Final    Calcium 08/18/2023 9.4  8.6 - 10.5 mg/dL Final    BUN/Creatinine Ratio 08/18/2023 10.7  7.0 - 25.0 Final    Anion Gap 08/18/2023 12.0  5.0 - 15.0 mmol/L Final    eGFR 08/18/2023 93.1  >60.0 mL/min/1.73 Final    Total Cholesterol 08/18/2023 191  0 - 200 mg/dL Final    Triglycerides 08/18/2023 79  0 - 150 mg/dL Final    HDL Cholesterol 08/18/2023 54  40 - 60 mg/dL Final    LDL Cholesterol  08/18/2023 123 (H)  0 - 100 mg/dL Final    VLDL Cholesterol 08/18/2023 14  5 - 40 mg/dL Final    LDL/HDL Ratio 08/18/2023 2.24   Final    Hemoglobin A1C 08/18/2023 5.30  4.80 - 5.60 % Final    WBC 08/18/2023 8.80  3.40 - 10.80 10*3/mm3 Final    RBC 08/18/2023 4.48  3.77 - 5.28 10*6/mm3 Final    Hemoglobin 08/18/2023 13.8  12.0 - 15.9 g/dL Final    Hematocrit 08/18/2023 40.4  34.0 - 46.6 % Final     MCV 08/18/2023 90.2  79.0 - 97.0 fL Final    MCH 08/18/2023 30.8  26.6 - 33.0 pg Final    MCHC 08/18/2023 34.2  31.5 - 35.7 g/dL Final    RDW 08/18/2023 12.3  12.3 - 15.4 % Final    RDW-SD 08/18/2023 40.2  37.0 - 54.0 fl Final    MPV 08/18/2023 12.1 (H)  6.0 - 12.0 fL Final    Platelets 08/18/2023 275  140 - 450 10*3/mm3 Final    Neutrophil % 08/18/2023 61.0  42.7 - 76.0 % Final    Lymphocyte % 08/18/2023 31.3  19.6 - 45.3 % Final    Monocyte % 08/18/2023 6.4  5.0 - 12.0 % Final    Eosinophil % 08/18/2023 0.5  0.3 - 6.2 % Final    Basophil % 08/18/2023 0.6  0.0 - 1.5 % Final    Immature Grans % 08/18/2023 0.2  0.0 - 0.5 % Final    Neutrophils, Absolute 08/18/2023 5.38  1.70 - 7.00 10*3/mm3 Final    Lymphocytes, Absolute 08/18/2023 2.75  0.70 - 3.10 10*3/mm3 Final    Monocytes, Absolute 08/18/2023 0.56  0.10 - 0.90 10*3/mm3 Final    Eosinophils, Absolute 08/18/2023 0.04  0.00 - 0.40 10*3/mm3 Final    Basophils, Absolute 08/18/2023 0.05  0.00 - 0.20 10*3/mm3 Final    Immature Grans, Absolute 08/18/2023 0.02  0.00 - 0.05 10*3/mm3 Final    nRBC 08/18/2023 0.0  0.0 - 0.2 /100 WBC Final                   Assessment and Plan {CC Problem List  Visit Diagnosis  ROS  Review (Popup)  Health Maintenance  Quality  BestPractice  Medications  SmartSets  SnapShot Encounters  Media :23}   1. Near syncope  - Holter Monitor - 72 Hour Up To 15 Days; Future  - Adult Transthoracic Echo Complete W/ Cont if Necessary Per Protocol; Future    2. Palpitations    - Holter Monitor - 72 Hour Up To 15 Days; Future  - Adult Transthoracic Echo Complete W/ Cont if Necessary Per Protocol; Future    3. Family history of atrial fibrillation   Multiple family members with afib.              Follow Up {Instructions Charge Capture  Follow-up Communications :23}   Return if symptoms worsen or fail to improve, for Telephone visit, palpitations, monitor results.    Patient was given instructions and counseling regarding her condition or for  health maintenance advice. Please see specific information pulled into the AVS if appropriate.  Patient was instructed to call the [facility] with any questions, concerns, or worsening symptoms.

## 2023-09-01 NOTE — PROGRESS NOTES
[facility] Heart Monitor Documentation    Stephanie Quintanilla  1990  [id number]  09/01/23      [] ZIO XT Patch  Model [id number] Prescribed for  Days    Serial Number: (N + 9 Digits) N   Apply-By Date on Box:   USPS Tracking Number:   USPS Tracking        [] [employer] MINI PLUS Mobile Cardiac Telemetry  Model BGMINIPLUS Prescribed for  Days    Serial Number: (BGM + 7 Digits) BGM  Shipped-By Date on Box:   UPS Tracking Number: [id number]  UPS Tracking      [x] [employer] MINI Holter Monitor  Model BGMINIEL Prescribed for 14 Days    Serial Number: (7 Digits) [id number]  Shipped-By Date on Box: 08/26/2023  UPS Tracking Number: [id number]  UPS Tracking        This monitor was applied to the patient's chest and checked for proper functioning.  Ms. Stephanie Quintanilla was instructed in the proper use of this monitor.  She was given the opportunity to ask questions and left the office with the device 's instruction manual.    Leana Barba, RUDY, 11:20 EDT, 09/01/23                  [facility] 8[facility]     Detail Level: Detailed Detail Level: Generalized

## 2023-09-12 ENCOUNTER — TRANSCRIBE ORDERS (OUTPATIENT)
Dept: ADMINISTRATIVE | Facility: HOSPITAL | Age: 33
End: 2023-09-12
Payer: COMMERCIAL

## 2023-09-12 DIAGNOSIS — N63.12 BREAST LUMP ON RIGHT SIDE AT 1 O'CLOCK POSITION: Primary | ICD-10-CM

## 2023-10-11 NOTE — PROGRESS NOTES
Your cardiac heart monitor results have been reviewed.  Your heart monitor was placed on 9/1/2023.  Monitor duration was 14 days.  Your average heart rate was 70 bpm.  No concerning heart arrhythmias were identified.  Your results are considered acceptable.    If you have any questions or concerns or change in condition please call for follow-up visit if needed.

## 2023-10-27 ENCOUNTER — HOSPITAL ENCOUNTER (OUTPATIENT)
Dept: MAMMOGRAPHY | Facility: HOSPITAL | Age: 33
Discharge: HOME | End: 2023-10-27
Payer: COMMERCIAL

## 2023-10-27 ENCOUNTER — HOSPITAL ENCOUNTER (OUTPATIENT)
Dept: ULTRASOUND IMAGING | Facility: HOSPITAL | Age: 33
Discharge: HOME | End: 2023-10-27
Payer: COMMERCIAL

## 2023-10-27 DIAGNOSIS — N63.12 BREAST LUMP ON RIGHT SIDE AT 1 O'CLOCK POSITION: ICD-10-CM

## 2023-10-27 PROCEDURE — G0279 TOMOSYNTHESIS, MAMMO: HCPCS

## 2023-10-27 PROCEDURE — 77066 DX MAMMO INCL CAD BI: CPT

## 2023-10-27 PROCEDURE — 76642 ULTRASOUND BREAST LIMITED: CPT

## 2024-01-24 ENCOUNTER — TRANSCRIBE ORDERS (OUTPATIENT)
Dept: LAB | Facility: HOSPITAL | Age: 34
End: 2024-01-24
Payer: COMMERCIAL

## 2024-01-24 ENCOUNTER — LAB (OUTPATIENT)
Dept: LAB | Facility: HOSPITAL | Age: 34
End: 2024-01-24
Payer: COMMERCIAL

## 2024-01-24 DIAGNOSIS — Z3A.08 8 WEEKS GESTATION OF PREGNANCY: ICD-10-CM

## 2024-01-24 DIAGNOSIS — Z34.90 PREGNANCY, UNSPECIFIED GESTATIONAL AGE: Primary | ICD-10-CM

## 2024-01-24 DIAGNOSIS — O02.1 MISSED ABORTION: ICD-10-CM

## 2024-01-24 DIAGNOSIS — Z34.90 PREGNANCY, UNSPECIFIED GESTATIONAL AGE: ICD-10-CM

## 2024-01-24 LAB
ABO GROUP BLD: NORMAL
AMPHET+METHAMPHET UR QL: NEGATIVE
AMPHETAMINES UR QL: NEGATIVE
BARBITURATES UR QL SCN: NEGATIVE
BASOPHILS # BLD AUTO: 0.04 10*3/MM3 (ref 0–0.2)
BASOPHILS NFR BLD AUTO: 0.5 % (ref 0–1.5)
BENZODIAZ UR QL SCN: NEGATIVE
BLD GP AB SCN SERPL QL: NEGATIVE
BUPRENORPHINE SERPL-MCNC: NEGATIVE NG/ML
CANNABINOIDS SERPL QL: NEGATIVE
COCAINE UR QL: NEGATIVE
DEPRECATED RDW RBC AUTO: 40.7 FL (ref 37–54)
EOSINOPHIL # BLD AUTO: 0.02 10*3/MM3 (ref 0–0.4)
EOSINOPHIL NFR BLD AUTO: 0.3 % (ref 0.3–6.2)
ERYTHROCYTE [DISTWIDTH] IN BLOOD BY AUTOMATED COUNT: 11.9 % (ref 12.3–15.4)
FENTANYL UR-MCNC: NEGATIVE NG/ML
HCT VFR BLD AUTO: 38.3 % (ref 34–46.6)
HGB BLD-MCNC: 12.8 G/DL (ref 12–15.9)
IMM GRANULOCYTES # BLD AUTO: 0.02 10*3/MM3 (ref 0–0.05)
IMM GRANULOCYTES NFR BLD AUTO: 0.3 % (ref 0–0.5)
LYMPHOCYTES # BLD AUTO: 2.05 10*3/MM3 (ref 0.7–3.1)
LYMPHOCYTES NFR BLD AUTO: 25.9 % (ref 19.6–45.3)
MCH RBC QN AUTO: 31.2 PG (ref 26.6–33)
MCHC RBC AUTO-ENTMCNC: 33.4 G/DL (ref 31.5–35.7)
MCV RBC AUTO: 93.4 FL (ref 79–97)
METHADONE UR QL SCN: NEGATIVE
MONOCYTES # BLD AUTO: 0.45 10*3/MM3 (ref 0.1–0.9)
MONOCYTES NFR BLD AUTO: 5.7 % (ref 5–12)
NEUTROPHILS NFR BLD AUTO: 5.32 10*3/MM3 (ref 1.7–7)
NEUTROPHILS NFR BLD AUTO: 67.3 % (ref 42.7–76)
NRBC BLD AUTO-RTO: 0 /100 WBC (ref 0–0.2)
OPIATES UR QL: NEGATIVE
OXYCODONE UR QL SCN: NEGATIVE
PCP UR QL SCN: NEGATIVE
PLATELET # BLD AUTO: 264 10*3/MM3 (ref 140–450)
PMV BLD AUTO: 12.1 FL (ref 6–12)
RBC # BLD AUTO: 4.1 10*6/MM3 (ref 3.77–5.28)
RH BLD: POSITIVE
TRICYCLICS UR QL SCN: NEGATIVE
WBC NRBC COR # BLD AUTO: 7.9 10*3/MM3 (ref 3.4–10.8)

## 2024-01-24 PROCEDURE — 87491 CHLMYD TRACH DNA AMP PROBE: CPT

## 2024-01-24 PROCEDURE — 87591 N.GONORRHOEAE DNA AMP PROB: CPT

## 2024-01-24 PROCEDURE — 80307 DRUG TEST PRSMV CHEM ANLYZR: CPT

## 2024-01-24 PROCEDURE — 36415 COLL VENOUS BLD VENIPUNCTURE: CPT

## 2024-01-24 PROCEDURE — 87086 URINE CULTURE/COLONY COUNT: CPT | Performed by: ADVANCED PRACTICE MIDWIFE

## 2024-01-24 PROCEDURE — 84443 ASSAY THYROID STIM HORMONE: CPT

## 2024-01-24 PROCEDURE — 82947 ASSAY GLUCOSE BLOOD QUANT: CPT

## 2024-01-24 PROCEDURE — 83036 HEMOGLOBIN GLYCOSYLATED A1C: CPT

## 2024-01-24 PROCEDURE — 80081 OBSTETRIC PANEL INC HIV TSTG: CPT

## 2024-01-24 PROCEDURE — 81003 URINALYSIS AUTO W/O SCOPE: CPT

## 2024-01-24 PROCEDURE — 87661 TRICHOMONAS VAGINALIS AMPLIF: CPT

## 2024-01-25 LAB
BACTERIA SPEC AEROBE CULT: NO GROWTH
BILIRUB UR QL STRIP: NEGATIVE
CLARITY UR: CLEAR
COLOR UR: YELLOW
GLUCOSE SERPL-MCNC: 78 MG/DL (ref 65–99)
GLUCOSE UR STRIP-MCNC: NEGATIVE MG/DL
HBA1C MFR BLD: 5.4 % (ref 4.8–5.6)
HBV SURFACE AG SERPL QL IA: NORMAL
HGB UR QL STRIP.AUTO: NEGATIVE
HIV 1+2 AB+HIV1 P24 AG SERPL QL IA: NORMAL
HOLD SPECIMEN: NORMAL
KETONES UR QL STRIP: NEGATIVE
LEUKOCYTE ESTERASE UR QL STRIP.AUTO: NEGATIVE
NITRITE UR QL STRIP: NEGATIVE
PH UR STRIP.AUTO: 7 [PH] (ref 5–8)
PROT UR QL STRIP: NEGATIVE
RPR SER QL: NORMAL
SP GR UR STRIP: 1.01 (ref 1–1.03)
TSH SERPL DL<=0.05 MIU/L-ACNC: 0.87 UIU/ML (ref 0.27–4.2)
UROBILINOGEN UR QL STRIP: NORMAL

## 2024-01-26 LAB — RUBV IGG SERPL IA-ACNC: 1.26 INDEX

## 2024-01-29 LAB
C TRACH RRNA SPEC QL NAA+PROBE: NEGATIVE
N GONORRHOEA RRNA SPEC QL NAA+PROBE: NEGATIVE
T VAGINALIS RRNA SPEC QL NAA+PROBE: NEGATIVE

## 2024-06-05 ENCOUNTER — TRANSCRIBE ORDERS (OUTPATIENT)
Dept: LAB | Facility: HOSPITAL | Age: 34
End: 2024-06-05
Payer: COMMERCIAL

## 2024-06-05 ENCOUNTER — LAB (OUTPATIENT)
Dept: LAB | Facility: HOSPITAL | Age: 34
End: 2024-06-05
Payer: COMMERCIAL

## 2024-06-05 DIAGNOSIS — Z3A.25 25 WEEKS GESTATION OF PREGNANCY: ICD-10-CM

## 2024-06-05 DIAGNOSIS — Z34.82 PRENATAL CARE, SUBSEQUENT PREGNANCY, SECOND TRIMESTER: Primary | ICD-10-CM

## 2024-06-05 DIAGNOSIS — Z34.82 PRENATAL CARE, SUBSEQUENT PREGNANCY, SECOND TRIMESTER: ICD-10-CM

## 2024-06-05 LAB
BLD GP AB SCN SERPL QL: NEGATIVE
DEPRECATED RDW RBC AUTO: 37.4 FL (ref 37–54)
ERYTHROCYTE [DISTWIDTH] IN BLOOD BY AUTOMATED COUNT: 11.7 % (ref 12.3–15.4)
GLUCOSE 1H P 100 G GLC PO SERPL-MCNC: 161 MG/DL (ref 65–139)
GLUCOSE BLDC GLUCOMTR-MCNC: 176 MG/DL
HCT VFR BLD AUTO: 36.2 % (ref 34–46.6)
HCV AB SER QL: NORMAL
HGB BLD-MCNC: 12.3 G/DL (ref 12–15.9)
MCH RBC QN AUTO: 29.9 PG (ref 26.6–33)
MCHC RBC AUTO-ENTMCNC: 34 G/DL (ref 31.5–35.7)
MCV RBC AUTO: 88.1 FL (ref 79–97)
PLATELET # BLD AUTO: 201 10*3/MM3 (ref 140–450)
PMV BLD AUTO: 13.4 FL (ref 6–12)
RBC # BLD AUTO: 4.11 10*6/MM3 (ref 3.77–5.28)
WBC NRBC COR # BLD AUTO: 7.65 10*3/MM3 (ref 3.4–10.8)

## 2024-06-05 PROCEDURE — 82948 REAGENT STRIP/BLOOD GLUCOSE: CPT | Performed by: ADVANCED PRACTICE MIDWIFE

## 2024-06-05 PROCEDURE — 36415 COLL VENOUS BLD VENIPUNCTURE: CPT

## 2024-06-05 PROCEDURE — 85027 COMPLETE CBC AUTOMATED: CPT

## 2024-06-05 PROCEDURE — 86780 TREPONEMA PALLIDUM: CPT

## 2024-06-05 PROCEDURE — 82950 GLUCOSE TEST: CPT

## 2024-06-05 PROCEDURE — 86850 RBC ANTIBODY SCREEN: CPT

## 2024-06-05 PROCEDURE — 86803 HEPATITIS C AB TEST: CPT

## 2024-06-06 LAB — TREPONEMA PALLIDUM IGG+IGM AB [PRESENCE] IN SERUM OR PLASMA BY IMMUNOASSAY: NON REACTIVE

## 2024-06-07 ENCOUNTER — LAB (OUTPATIENT)
Dept: LAB | Facility: HOSPITAL | Age: 34
End: 2024-06-07
Payer: COMMERCIAL

## 2024-06-07 ENCOUNTER — TRANSCRIBE ORDERS (OUTPATIENT)
Dept: LAB | Facility: HOSPITAL | Age: 34
End: 2024-06-07
Payer: COMMERCIAL

## 2024-06-07 DIAGNOSIS — Z34.83 PRENATAL CARE, SUBSEQUENT PREGNANCY, THIRD TRIMESTER: ICD-10-CM

## 2024-06-07 DIAGNOSIS — Z34.83 PRENATAL CARE, SUBSEQUENT PREGNANCY, THIRD TRIMESTER: Primary | ICD-10-CM

## 2024-06-07 LAB
GLUCOSE 1H P 100 G GLC PO SERPL-MCNC: 174 MG/DL (ref 74–180)
GLUCOSE 2H P 100 G GLC PO SERPL-MCNC: 146 MG/DL (ref 74–155)
GLUCOSE 3H P 100 G GLC PO SERPL-MCNC: 132 MG/DL (ref 74–140)
GLUCOSE P FAST SERPL-MCNC: 85 MG/DL (ref 74–106)

## 2024-06-07 PROCEDURE — 36415 COLL VENOUS BLD VENIPUNCTURE: CPT

## 2024-06-07 PROCEDURE — 82952 GTT-ADDED SAMPLES: CPT

## 2024-06-07 PROCEDURE — 82948 REAGENT STRIP/BLOOD GLUCOSE: CPT | Performed by: ADVANCED PRACTICE MIDWIFE

## 2024-06-07 PROCEDURE — 82951 GLUCOSE TOLERANCE TEST (GTT): CPT

## 2024-07-17 ENCOUNTER — TRANSCRIBE ORDERS (OUTPATIENT)
Dept: LAB | Facility: HOSPITAL | Age: 34
End: 2024-07-17
Payer: COMMERCIAL

## 2024-07-17 ENCOUNTER — LAB (OUTPATIENT)
Dept: LAB | Facility: HOSPITAL | Age: 34
End: 2024-07-17
Payer: COMMERCIAL

## 2024-07-17 DIAGNOSIS — R60.0 LOCALIZED EDEMA: Primary | ICD-10-CM

## 2024-07-17 DIAGNOSIS — R60.0 LOCALIZED EDEMA: ICD-10-CM

## 2024-07-17 LAB
ALT SERPL W P-5'-P-CCNC: 16 U/L (ref 1–33)
AST SERPL-CCNC: 17 U/L (ref 1–32)
BASOPHILS # BLD AUTO: 0.02 10*3/MM3 (ref 0–0.2)
BASOPHILS NFR BLD AUTO: 0.2 % (ref 0–1.5)
CREAT UR-MCNC: 104.7 MG/DL
DEPRECATED RDW RBC AUTO: 38.6 FL (ref 37–54)
EOSINOPHIL # BLD AUTO: 0.02 10*3/MM3 (ref 0–0.4)
EOSINOPHIL NFR BLD AUTO: 0.2 % (ref 0.3–6.2)
ERYTHROCYTE [DISTWIDTH] IN BLOOD BY AUTOMATED COUNT: 12.4 % (ref 12.3–15.4)
HCT VFR BLD AUTO: 34.7 % (ref 34–46.6)
HGB BLD-MCNC: 11.8 G/DL (ref 12–15.9)
IMM GRANULOCYTES # BLD AUTO: 0.04 10*3/MM3 (ref 0–0.05)
IMM GRANULOCYTES NFR BLD AUTO: 0.5 % (ref 0–0.5)
LDH SERPL-CCNC: 209 U/L (ref 135–214)
LYMPHOCYTES # BLD AUTO: 1.63 10*3/MM3 (ref 0.7–3.1)
LYMPHOCYTES NFR BLD AUTO: 19 % (ref 19.6–45.3)
MCH RBC QN AUTO: 29.6 PG (ref 26.6–33)
MCHC RBC AUTO-ENTMCNC: 34 G/DL (ref 31.5–35.7)
MCV RBC AUTO: 87 FL (ref 79–97)
MONOCYTES # BLD AUTO: 0.54 10*3/MM3 (ref 0.1–0.9)
MONOCYTES NFR BLD AUTO: 6.3 % (ref 5–12)
NEUTROPHILS NFR BLD AUTO: 6.32 10*3/MM3 (ref 1.7–7)
NEUTROPHILS NFR BLD AUTO: 73.8 % (ref 42.7–76)
PLATELET # BLD AUTO: 195 10*3/MM3 (ref 140–450)
PMV BLD AUTO: 13.5 FL (ref 6–12)
PROT ?TM UR-MCNC: 23.1 MG/DL
RBC # BLD AUTO: 3.99 10*6/MM3 (ref 3.77–5.28)
URATE SERPL-MCNC: 3.9 MG/DL (ref 2.4–5.7)
WBC NRBC COR # BLD AUTO: 8.57 10*3/MM3 (ref 3.4–10.8)

## 2024-07-17 PROCEDURE — 85025 COMPLETE CBC W/AUTO DIFF WBC: CPT

## 2024-07-17 PROCEDURE — 84550 ASSAY OF BLOOD/URIC ACID: CPT

## 2024-07-17 PROCEDURE — 84460 ALANINE AMINO (ALT) (SGPT): CPT

## 2024-07-17 PROCEDURE — 82570 ASSAY OF URINE CREATININE: CPT

## 2024-07-17 PROCEDURE — 84156 ASSAY OF PROTEIN URINE: CPT

## 2024-07-17 PROCEDURE — 36415 COLL VENOUS BLD VENIPUNCTURE: CPT

## 2024-07-17 PROCEDURE — 84450 TRANSFERASE (AST) (SGOT): CPT

## 2024-07-17 PROCEDURE — 83615 LACTATE (LD) (LDH) ENZYME: CPT

## 2024-07-20 ENCOUNTER — LAB (OUTPATIENT)
Dept: LAB | Facility: HOSPITAL | Age: 34
End: 2024-07-20
Payer: COMMERCIAL

## 2024-07-20 DIAGNOSIS — R60.0 LOCALIZED EDEMA: ICD-10-CM

## 2024-07-20 LAB
COLLECT DURATION TIME UR: 24 HRS
CREAT CL 24H UR+SERPL-VRATE: NORMAL ML/MIN
CREAT CL 24H UR+SERPL-VRATE: NORMAL ML/MIN
CREAT UR-MCNC: 64.9 MG/DL
CREATINE 24H UR-MRATE: 1.46 G/24 HR (ref 0.7–1.6)
Lab: NORMAL
SPECIMEN VOL 24H UR: 2250 ML

## 2024-07-20 PROCEDURE — 82575 CREATININE CLEARANCE TEST: CPT

## 2024-08-11 ENCOUNTER — ANESTHESIA (OUTPATIENT)
Dept: LABOR AND DELIVERY | Facility: HOSPITAL | Age: 34
End: 2024-08-11
Payer: COMMERCIAL

## 2024-08-11 ENCOUNTER — ANESTHESIA EVENT (OUTPATIENT)
Dept: LABOR AND DELIVERY | Facility: HOSPITAL | Age: 34
End: 2024-08-11
Payer: COMMERCIAL

## 2024-08-11 ENCOUNTER — HOSPITAL ENCOUNTER (INPATIENT)
Facility: HOSPITAL | Age: 34
LOS: 3 days | Discharge: HOME | End: 2024-08-14
Attending: OBSTETRICS & GYNECOLOGY | Admitting: STUDENT IN AN ORGANIZED HEALTH CARE EDUCATION/TRAINING PROGRAM
Payer: COMMERCIAL

## 2024-08-11 PROBLEM — N93.9 VAGINAL BLEEDING: Status: ACTIVE | Noted: 2024-08-11

## 2024-08-11 LAB
ABO GROUP BLD: NORMAL
BLD GP AB SCN SERPL QL: NEGATIVE
DEPRECATED RDW RBC AUTO: 45.1 FL (ref 37–54)
ERYTHROCYTE [DISTWIDTH] IN BLOOD BY AUTOMATED COUNT: 14.2 % (ref 12.3–15.4)
HCT VFR BLD AUTO: 35.2 % (ref 34–46.6)
HGB BLD-MCNC: 11.9 G/DL (ref 12–15.9)
MCH RBC QN AUTO: 29.5 PG (ref 26.6–33)
MCHC RBC AUTO-ENTMCNC: 33.8 G/DL (ref 31.5–35.7)
MCV RBC AUTO: 87.1 FL (ref 79–97)
PLATELET # BLD AUTO: 190 10*3/MM3 (ref 140–450)
PMV BLD AUTO: 13.5 FL (ref 6–12)
RBC # BLD AUTO: 4.04 10*6/MM3 (ref 3.77–5.28)
RH BLD: POSITIVE
T&S EXPIRATION DATE: NORMAL
TREPONEMA PALLIDUM IGG+IGM AB [PRESENCE] IN SERUM OR PLASMA BY IMMUNOASSAY: NORMAL
WBC NRBC COR # BLD AUTO: 9.59 10*3/MM3 (ref 3.4–10.8)

## 2024-08-11 PROCEDURE — 25010000002 BUPIVACAINE (PF) 0.25 % SOLUTION: Performed by: ANESTHESIOLOGY

## 2024-08-11 PROCEDURE — 86780 TREPONEMA PALLIDUM: CPT | Performed by: STUDENT IN AN ORGANIZED HEALTH CARE EDUCATION/TRAINING PROGRAM

## 2024-08-11 PROCEDURE — 86850 RBC ANTIBODY SCREEN: CPT | Performed by: STUDENT IN AN ORGANIZED HEALTH CARE EDUCATION/TRAINING PROGRAM

## 2024-08-11 PROCEDURE — 25010000002 ROPIVACAINE PER 1 MG: Performed by: ANESTHESIOLOGY

## 2024-08-11 PROCEDURE — 25010000002 FENTANYL CITRATE (PF) 50 MCG/ML SOLUTION: Performed by: ANESTHESIOLOGY

## 2024-08-11 PROCEDURE — 25810000003 LACTATED RINGERS SOLUTION: Performed by: ANESTHESIOLOGY

## 2024-08-11 PROCEDURE — 25810000003 LACTATED RINGERS PER 1000 ML: Performed by: STUDENT IN AN ORGANIZED HEALTH CARE EDUCATION/TRAINING PROGRAM

## 2024-08-11 PROCEDURE — C1755 CATHETER, INTRASPINAL: HCPCS | Performed by: ANESTHESIOLOGY

## 2024-08-11 PROCEDURE — 85027 COMPLETE CBC AUTOMATED: CPT | Performed by: STUDENT IN AN ORGANIZED HEALTH CARE EDUCATION/TRAINING PROGRAM

## 2024-08-11 PROCEDURE — 86901 BLOOD TYPING SEROLOGIC RH(D): CPT | Performed by: STUDENT IN AN ORGANIZED HEALTH CARE EDUCATION/TRAINING PROGRAM

## 2024-08-11 PROCEDURE — 86900 BLOOD TYPING SEROLOGIC ABO: CPT | Performed by: STUDENT IN AN ORGANIZED HEALTH CARE EDUCATION/TRAINING PROGRAM

## 2024-08-11 RX ORDER — OXYTOCIN/0.9 % SODIUM CHLORIDE 30/500 ML
2-20 PLASTIC BAG, INJECTION (ML) INTRAVENOUS
Status: DISCONTINUED | OUTPATIENT
Start: 2024-08-11 | End: 2024-08-12 | Stop reason: HOSPADM

## 2024-08-11 RX ORDER — EPHEDRINE SULFATE 5 MG/ML
10 INJECTION INTRAVENOUS
Status: DISCONTINUED | OUTPATIENT
Start: 2024-08-11 | End: 2024-08-12 | Stop reason: HOSPADM

## 2024-08-11 RX ORDER — TERBUTALINE SULFATE 1 MG/ML
0.25 INJECTION, SOLUTION SUBCUTANEOUS AS NEEDED
Status: DISCONTINUED | OUTPATIENT
Start: 2024-08-11 | End: 2024-08-12 | Stop reason: HOSPADM

## 2024-08-11 RX ORDER — SODIUM CHLORIDE, SODIUM LACTATE, POTASSIUM CHLORIDE, CALCIUM CHLORIDE 600; 310; 30; 20 MG/100ML; MG/100ML; MG/100ML; MG/100ML
125 INJECTION, SOLUTION INTRAVENOUS CONTINUOUS
Status: DISCONTINUED | OUTPATIENT
Start: 2024-08-11 | End: 2024-08-14 | Stop reason: HOSPADM

## 2024-08-11 RX ORDER — CITRIC ACID/SODIUM CITRATE 334-500MG
30 SOLUTION, ORAL ORAL ONCE
Status: DISCONTINUED | OUTPATIENT
Start: 2024-08-11 | End: 2024-08-12 | Stop reason: HOSPADM

## 2024-08-11 RX ORDER — MAGNESIUM CARB/ALUMINUM HYDROX 105-160MG
30 TABLET,CHEWABLE ORAL ONCE
Status: DISCONTINUED | OUTPATIENT
Start: 2024-08-11 | End: 2024-08-12 | Stop reason: HOSPADM

## 2024-08-11 RX ORDER — DIPHENHYDRAMINE HYDROCHLORIDE 50 MG/ML
12.5 INJECTION INTRAMUSCULAR; INTRAVENOUS EVERY 8 HOURS PRN
Status: DISCONTINUED | OUTPATIENT
Start: 2024-08-11 | End: 2024-08-12 | Stop reason: HOSPADM

## 2024-08-11 RX ORDER — CALCIUM CARBONATE 500 MG/1
2 TABLET, CHEWABLE ORAL 3 TIMES DAILY PRN
Status: DISCONTINUED | OUTPATIENT
Start: 2024-08-11 | End: 2024-08-12 | Stop reason: HOSPADM

## 2024-08-11 RX ORDER — FENTANYL CITRATE 50 UG/ML
INJECTION, SOLUTION INTRAMUSCULAR; INTRAVENOUS AS NEEDED
Status: DISCONTINUED | OUTPATIENT
Start: 2024-08-11 | End: 2024-08-12 | Stop reason: SURG

## 2024-08-11 RX ORDER — ACETAMINOPHEN 325 MG/1
650 TABLET ORAL EVERY 4 HOURS PRN
Status: DISCONTINUED | OUTPATIENT
Start: 2024-08-11 | End: 2024-08-12 | Stop reason: HOSPADM

## 2024-08-11 RX ORDER — ONDANSETRON 4 MG/1
4 TABLET, ORALLY DISINTEGRATING ORAL EVERY 6 HOURS PRN
Status: DISCONTINUED | OUTPATIENT
Start: 2024-08-11 | End: 2024-08-12 | Stop reason: HOSPADM

## 2024-08-11 RX ORDER — BUPIVACAINE HYDROCHLORIDE 2.5 MG/ML
INJECTION, SOLUTION EPIDURAL; INFILTRATION; INTRACAUDAL AS NEEDED
Status: DISCONTINUED | OUTPATIENT
Start: 2024-08-11 | End: 2024-08-12 | Stop reason: SURG

## 2024-08-11 RX ORDER — MISOPROSTOL 200 UG/1
800 TABLET ORAL AS NEEDED
Status: DISCONTINUED | OUTPATIENT
Start: 2024-08-11 | End: 2024-08-12 | Stop reason: HOSPADM

## 2024-08-11 RX ORDER — LIDOCAINE HYDROCHLORIDE 10 MG/ML
0.5 INJECTION, SOLUTION EPIDURAL; INFILTRATION; INTRACAUDAL; PERINEURAL ONCE AS NEEDED
Status: DISCONTINUED | OUTPATIENT
Start: 2024-08-11 | End: 2024-08-12 | Stop reason: HOSPADM

## 2024-08-11 RX ORDER — LIDOCAINE HYDROCHLORIDE AND EPINEPHRINE 15; 5 MG/ML; UG/ML
INJECTION, SOLUTION EPIDURAL AS NEEDED
Status: DISCONTINUED | OUTPATIENT
Start: 2024-08-11 | End: 2024-08-12 | Stop reason: SURG

## 2024-08-11 RX ORDER — ONDANSETRON 2 MG/ML
4 INJECTION INTRAMUSCULAR; INTRAVENOUS EVERY 6 HOURS PRN
Status: DISCONTINUED | OUTPATIENT
Start: 2024-08-11 | End: 2024-08-12 | Stop reason: HOSPADM

## 2024-08-11 RX ORDER — SODIUM CHLORIDE 0.9 % (FLUSH) 0.9 %
10 SYRINGE (ML) INJECTION EVERY 12 HOURS SCHEDULED
Status: DISCONTINUED | OUTPATIENT
Start: 2024-08-11 | End: 2024-08-12 | Stop reason: HOSPADM

## 2024-08-11 RX ORDER — SODIUM CHLORIDE 9 MG/ML
40 INJECTION, SOLUTION INTRAVENOUS AS NEEDED
Status: DISCONTINUED | OUTPATIENT
Start: 2024-08-11 | End: 2024-08-12 | Stop reason: HOSPADM

## 2024-08-11 RX ORDER — CALCIUM CARBONATE 500 MG/1
1 TABLET, CHEWABLE ORAL AS NEEDED
COMMUNITY
End: 2024-08-14

## 2024-08-11 RX ORDER — CARBOPROST TROMETHAMINE 250 UG/ML
250 INJECTION, SOLUTION INTRAMUSCULAR AS NEEDED
Status: DISCONTINUED | OUTPATIENT
Start: 2024-08-11 | End: 2024-08-12 | Stop reason: HOSPADM

## 2024-08-11 RX ORDER — METHYLERGONOVINE MALEATE 0.2 MG/ML
200 INJECTION INTRAVENOUS ONCE AS NEEDED
Status: DISCONTINUED | OUTPATIENT
Start: 2024-08-11 | End: 2024-08-12 | Stop reason: HOSPADM

## 2024-08-11 RX ORDER — OXYTOCIN/0.9 % SODIUM CHLORIDE 30/500 ML
30 PLASTIC BAG, INJECTION (ML) INTRAVENOUS CONTINUOUS
Status: ACTIVE | OUTPATIENT
Start: 2024-08-11 | End: 2024-08-11

## 2024-08-11 RX ORDER — IBUPROFEN 600 MG/1
600 TABLET ORAL EVERY 6 HOURS PRN
Status: DISCONTINUED | OUTPATIENT
Start: 2024-08-11 | End: 2024-08-12 | Stop reason: HOSPADM

## 2024-08-11 RX ORDER — PROMETHAZINE HYDROCHLORIDE 12.5 MG/1
12.5 TABLET ORAL EVERY 6 HOURS PRN
Status: DISCONTINUED | OUTPATIENT
Start: 2024-08-11 | End: 2024-08-12 | Stop reason: HOSPADM

## 2024-08-11 RX ORDER — OXYTOCIN/0.9 % SODIUM CHLORIDE 30/500 ML
30 PLASTIC BAG, INJECTION (ML) INTRAVENOUS ONCE
Status: DISCONTINUED | OUTPATIENT
Start: 2024-08-11 | End: 2024-08-12 | Stop reason: HOSPADM

## 2024-08-11 RX ORDER — SODIUM CHLORIDE 0.9 % (FLUSH) 0.9 %
10 SYRINGE (ML) INJECTION AS NEEDED
Status: DISCONTINUED | OUTPATIENT
Start: 2024-08-11 | End: 2024-08-12 | Stop reason: HOSPADM

## 2024-08-11 RX ORDER — FAMOTIDINE 10 MG/ML
20 INJECTION, SOLUTION INTRAVENOUS ONCE AS NEEDED
Status: COMPLETED | OUTPATIENT
Start: 2024-08-11 | End: 2024-08-12

## 2024-08-11 RX ORDER — PROMETHAZINE HYDROCHLORIDE 12.5 MG/1
12.5 SUPPOSITORY RECTAL EVERY 6 HOURS PRN
Status: DISCONTINUED | OUTPATIENT
Start: 2024-08-11 | End: 2024-08-12 | Stop reason: HOSPADM

## 2024-08-11 RX ORDER — ROPIVACAINE HYDROCHLORIDE 2 MG/ML
15 INJECTION, SOLUTION EPIDURAL; INFILTRATION; PERINEURAL CONTINUOUS
Status: DISCONTINUED | OUTPATIENT
Start: 2024-08-11 | End: 2024-08-14 | Stop reason: HOSPADM

## 2024-08-11 RX ADMIN — Medication 2 MILLI-UNITS/MIN: at 16:46

## 2024-08-11 RX ADMIN — SODIUM CHLORIDE, POTASSIUM CHLORIDE, SODIUM LACTATE AND CALCIUM CHLORIDE 999 ML/HR: 600; 310; 30; 20 INJECTION, SOLUTION INTRAVENOUS at 18:28

## 2024-08-11 RX ADMIN — FENTANYL CITRATE 100 MCG: 50 INJECTION, SOLUTION INTRAMUSCULAR; INTRAVENOUS at 18:47

## 2024-08-11 RX ADMIN — SODIUM CHLORIDE, POTASSIUM CHLORIDE, SODIUM LACTATE AND CALCIUM CHLORIDE 125 ML/HR: 600; 310; 30; 20 INJECTION, SOLUTION INTRAVENOUS at 19:02

## 2024-08-11 RX ADMIN — SODIUM CHLORIDE, POTASSIUM CHLORIDE, SODIUM LACTATE AND CALCIUM CHLORIDE 1000 ML: 600; 310; 30; 20 INJECTION, SOLUTION INTRAVENOUS at 18:24

## 2024-08-11 RX ADMIN — BUPIVACAINE HYDROCHLORIDE 12 ML: 2.5 INJECTION, SOLUTION EPIDURAL; INFILTRATION; INTRACAUDAL; PERINEURAL at 18:47

## 2024-08-11 RX ADMIN — LIDOCAINE HYDROCHLORIDE AND EPINEPHRINE 3 ML: 15; 5 INJECTION, SOLUTION EPIDURAL at 18:45

## 2024-08-11 RX ADMIN — LIDOCAINE HYDROCHLORIDE AND EPINEPHRINE 2 ML: 15; 5 INJECTION, SOLUTION EPIDURAL at 18:46

## 2024-08-11 RX ADMIN — ROPIVACAINE HYDROCHLORIDE 14 ML/HR: 2 INJECTION, SOLUTION EPIDURAL; INFILTRATION at 18:50

## 2024-08-11 RX ADMIN — SODIUM CHLORIDE, POTASSIUM CHLORIDE, SODIUM LACTATE AND CALCIUM CHLORIDE 125 ML/HR: 600; 310; 30; 20 INJECTION, SOLUTION INTRAVENOUS at 15:30

## 2024-08-11 NOTE — PLAN OF CARE
Goal Outcome Evaluation:         SVE 5-6/70/-1, Epidural, Pitocin infusing

## 2024-08-11 NOTE — ANESTHESIA PROCEDURE NOTES
Labor Epidural      Patient reassessed immediately prior to procedure    Patient location during procedure: OB  Performed By  Anesthesiologist: Reginaldo Ariza DO  Preanesthetic Checklist  Completed: patient identified, IV checked, site marked, risks and benefits discussed, surgical consent, monitors and equipment checked, pre-op evaluation and timeout performed  Prep:  Pt Position:sitting  Sterile Tech:gloves, mask, sterile barrier and cap  Prep:chlorhexidine gluconate and isopropyl alcohol  Monitoring:blood pressure monitoring and continuous pulse oximetry  Epidural Block Procedure:  Approach:midline  Guidance:landmark technique and palpation technique  Location:L3-L4  Needle Type:Tuohy  Needle Gauge:17 G  Loss of Resistance Medium: air  Loss of Resistance: 5cm  Cath Depth at skin:11 cm  Paresthesia: none  Aspiration:negative  Test Dose:negative  Number of Attempts: 1  Post Assessment:  Dressing:secured with tape and occlusive dressing applied (Tegaderm Placed)  Pt Tolerance:patient tolerated the procedure well with no apparent complications  Complications:no

## 2024-08-11 NOTE — ANESTHESIA PREPROCEDURE EVALUATION
Anesthesia Evaluation     Patient summary reviewed and Nursing notes reviewed                Airway   Mallampati: II  TM distance: >3 FB  Neck ROM: full  No difficulty expected  Dental      Pulmonary - negative pulmonary ROS   Cardiovascular - negative cardio ROS        Neuro/Psych- negative ROS  GI/Hepatic/Renal/Endo - negative ROS     Musculoskeletal     Abdominal    Substance History - negative use     OB/GYN    (+) Pregnant        Other   arthritis,                 Anesthesia Plan    ASA 2     epidural       Anesthetic plan, risks, benefits, and alternatives have been provided, discussed and informed consent has been obtained with: patient.    Use of blood products discussed with patient .      CODE STATUS:    Level Of Support Discussed With: Patient  Code Status (Patient has no pulse and is not breathing): CPR (Attempt to Resuscitate)  Medical Interventions (Patient has pulse or is breathing): Full Support

## 2024-08-12 LAB
ATMOSPHERIC PRESS: ABNORMAL MM[HG]
ATMOSPHERIC PRESS: ABNORMAL MM[HG]
BASE EXCESS BLDCOA CALC-SCNC: -7 MMOL/L (ref 0–2)
BASE EXCESS BLDCOV CALC-SCNC: -3.1 MMOL/L (ref 0–2)
BDY SITE: ABNORMAL
BDY SITE: ABNORMAL
BODY TEMPERATURE: 37
BODY TEMPERATURE: 37
CO2 BLDA-SCNC: 23.1 MMOL/L (ref 22–33)
CO2 BLDA-SCNC: 24.4 MMOL/L (ref 22–33)
EPAP: 0
EPAP: 0
HCO3 BLDCOA-SCNC: 22.5 MMOL/L (ref 16.9–20.5)
HCO3 BLDCOV-SCNC: 21.9 MMOL/L (ref 18.6–21.4)
HGB BLDA-MCNC: 14.8 G/DL (ref 14–18)
HGB BLDA-MCNC: 16.1 G/DL (ref 14–18)
INHALED O2 CONCENTRATION: 21 %
INHALED O2 CONCENTRATION: 21 %
IPAP: 0
IPAP: 0
Lab: ABNORMAL
MODALITY: ABNORMAL
MODALITY: ABNORMAL
NOTIFIED BY: ABNORMAL
NOTIFIED WHO: ABNORMAL
PAW @ PEAK INSP FLOW SETTING VENT: 0 CMH2O
PAW @ PEAK INSP FLOW SETTING VENT: 0 CMH2O
PCO2 BLDCOA: 60.6 MMHG (ref 43.3–54.9)
PCO2 BLDCOV: 38.2 MM HG (ref 28–40)
PH BLDCOA: 7.18 PH UNITS (ref 7.22–7.3)
PH BLDCOV: 7.37 PH UNITS (ref 7.31–7.37)
PO2 BLDCOA: 30 MMHG (ref 11.5–43.3)
PO2 BLDCOV: 29.8 MM HG (ref 21–31)
SAO2 % BLDCOA: ABNORMAL %
TOTAL RATE: 0 BREATHS/MINUTE
TOTAL RATE: 0 BREATHS/MINUTE
VENTILATOR MODE: ABNORMAL
VENTILATOR MODE: ABNORMAL

## 2024-08-12 PROCEDURE — 51702 INSERT TEMP BLADDER CATH: CPT

## 2024-08-12 PROCEDURE — 59025 FETAL NON-STRESS TEST: CPT

## 2024-08-12 PROCEDURE — 82805 BLOOD GASES W/O2 SATURATION: CPT

## 2024-08-12 PROCEDURE — C1755 CATHETER, INTRASPINAL: HCPCS

## 2024-08-12 PROCEDURE — 88307 TISSUE EXAM BY PATHOLOGIST: CPT | Performed by: STUDENT IN AN ORGANIZED HEALTH CARE EDUCATION/TRAINING PROGRAM

## 2024-08-12 PROCEDURE — 25810000003 LACTATED RINGERS PER 1000 ML: Performed by: STUDENT IN AN ORGANIZED HEALTH CARE EDUCATION/TRAINING PROGRAM

## 2024-08-12 RX ORDER — PROMETHAZINE HYDROCHLORIDE 12.5 MG/1
12.5 TABLET ORAL EVERY 4 HOURS PRN
Status: DISCONTINUED | OUTPATIENT
Start: 2024-08-12 | End: 2024-08-14 | Stop reason: HOSPADM

## 2024-08-12 RX ORDER — IBUPROFEN 600 MG/1
600 TABLET ORAL EVERY 6 HOURS PRN
Status: DISCONTINUED | OUTPATIENT
Start: 2024-08-12 | End: 2024-08-14 | Stop reason: HOSPADM

## 2024-08-12 RX ORDER — MISOPROSTOL 200 UG/1
800 TABLET ORAL AS NEEDED
Status: DISCONTINUED | OUTPATIENT
Start: 2024-08-12 | End: 2024-08-14 | Stop reason: HOSPADM

## 2024-08-12 RX ORDER — LIDOCAINE HCL/EPINEPHRINE/PF 2%-1:200K
VIAL (ML) INJECTION AS NEEDED
Status: DISCONTINUED | OUTPATIENT
Start: 2024-08-12 | End: 2024-08-12 | Stop reason: SURG

## 2024-08-12 RX ORDER — CALCIUM CARBONATE 500 MG/1
1 TABLET, CHEWABLE ORAL 3 TIMES DAILY PRN
Status: DISCONTINUED | OUTPATIENT
Start: 2024-08-12 | End: 2024-08-14 | Stop reason: HOSPADM

## 2024-08-12 RX ORDER — CARBOPROST TROMETHAMINE 250 UG/ML
250 INJECTION, SOLUTION INTRAMUSCULAR AS NEEDED
Status: DISCONTINUED | OUTPATIENT
Start: 2024-08-12 | End: 2024-08-14 | Stop reason: HOSPADM

## 2024-08-12 RX ORDER — ACETAMINOPHEN 325 MG/1
650 TABLET ORAL EVERY 6 HOURS PRN
Status: DISCONTINUED | OUTPATIENT
Start: 2024-08-12 | End: 2024-08-14 | Stop reason: HOSPADM

## 2024-08-12 RX ORDER — METHYLERGONOVINE MALEATE 0.2 MG/ML
200 INJECTION INTRAVENOUS ONCE AS NEEDED
Status: DISCONTINUED | OUTPATIENT
Start: 2024-08-12 | End: 2024-08-14 | Stop reason: HOSPADM

## 2024-08-12 RX ORDER — SIMETHICONE 80 MG
80 TABLET,CHEWABLE ORAL 4 TIMES DAILY PRN
Status: DISCONTINUED | OUTPATIENT
Start: 2024-08-12 | End: 2024-08-14 | Stop reason: HOSPADM

## 2024-08-12 RX ORDER — OXYTOCIN/0.9 % SODIUM CHLORIDE 30/500 ML
125 PLASTIC BAG, INJECTION (ML) INTRAVENOUS CONTINUOUS PRN
Status: DISCONTINUED | OUTPATIENT
Start: 2024-08-12 | End: 2024-08-14 | Stop reason: HOSPADM

## 2024-08-12 RX ORDER — DOCUSATE SODIUM 100 MG/1
100 CAPSULE, LIQUID FILLED ORAL 2 TIMES DAILY
Status: DISCONTINUED | OUTPATIENT
Start: 2024-08-12 | End: 2024-08-14 | Stop reason: HOSPADM

## 2024-08-12 RX ORDER — ONDANSETRON 2 MG/ML
4 INJECTION INTRAMUSCULAR; INTRAVENOUS EVERY 6 HOURS PRN
Status: DISCONTINUED | OUTPATIENT
Start: 2024-08-12 | End: 2024-08-14 | Stop reason: HOSPADM

## 2024-08-12 RX ORDER — DIPHENHYDRAMINE HCL 25 MG
25 CAPSULE ORAL NIGHTLY PRN
Status: DISCONTINUED | OUTPATIENT
Start: 2024-08-12 | End: 2024-08-14 | Stop reason: HOSPADM

## 2024-08-12 RX ORDER — PRENATAL VIT/IRON FUM/FOLIC AC 27MG-0.8MG
1 TABLET ORAL DAILY
Status: DISCONTINUED | OUTPATIENT
Start: 2024-08-12 | End: 2024-08-14 | Stop reason: HOSPADM

## 2024-08-12 RX ORDER — ONDANSETRON 4 MG/1
4 TABLET, ORALLY DISINTEGRATING ORAL EVERY 8 HOURS PRN
Status: DISCONTINUED | OUTPATIENT
Start: 2024-08-12 | End: 2024-08-14 | Stop reason: HOSPADM

## 2024-08-12 RX ORDER — SODIUM CHLORIDE 0.9 % (FLUSH) 0.9 %
1-10 SYRINGE (ML) INJECTION AS NEEDED
Status: DISCONTINUED | OUTPATIENT
Start: 2024-08-12 | End: 2024-08-14 | Stop reason: HOSPADM

## 2024-08-12 RX ORDER — HYDROCORTISONE 25 MG/G
1 CREAM TOPICAL AS NEEDED
Status: DISCONTINUED | OUTPATIENT
Start: 2024-08-12 | End: 2024-08-14 | Stop reason: HOSPADM

## 2024-08-12 RX ADMIN — SODIUM CHLORIDE, POTASSIUM CHLORIDE, SODIUM LACTATE AND CALCIUM CHLORIDE 125 ML/HR: 600; 310; 30; 20 INJECTION, SOLUTION INTRAVENOUS at 00:47

## 2024-08-12 RX ADMIN — IBUPROFEN 600 MG: 600 TABLET, FILM COATED ORAL at 23:56

## 2024-08-12 RX ADMIN — DOCUSATE SODIUM 100 MG: 100 CAPSULE, LIQUID FILLED ORAL at 09:52

## 2024-08-12 RX ADMIN — BENZOCAINE AND LEVOMENTHOL: 200; 5 SPRAY TOPICAL at 09:52

## 2024-08-12 RX ADMIN — PRENATAL VITAMINS-IRON FUMARATE 27 MG IRON-FOLIC ACID 0.8 MG TABLET 1 TABLET: at 09:52

## 2024-08-12 RX ADMIN — DOCUSATE SODIUM 100 MG: 100 CAPSULE, LIQUID FILLED ORAL at 20:07

## 2024-08-12 RX ADMIN — IBUPROFEN 600 MG: 600 TABLET, FILM COATED ORAL at 18:12

## 2024-08-12 RX ADMIN — Medication 1 APPLICATION: at 09:52

## 2024-08-12 RX ADMIN — FAMOTIDINE 20 MG: 10 INJECTION, SOLUTION INTRAVENOUS at 03:46

## 2024-08-12 RX ADMIN — WITCH HAZEL: 500 SOLUTION RECTAL; TOPICAL at 09:52

## 2024-08-12 RX ADMIN — IBUPROFEN 600 MG: 600 TABLET, FILM COATED ORAL at 08:05

## 2024-08-12 RX ADMIN — LIDOCAINE HYDROCHLORIDE AND EPINEPHRINE 7 ML: 20; 5 INJECTION, SOLUTION EPIDURAL; INFILTRATION; INTRACAUDAL; PERINEURAL at 04:00

## 2024-08-12 RX ADMIN — ACETAMINOPHEN 650 MG: 325 TABLET ORAL at 20:07

## 2024-08-12 RX ADMIN — SODIUM CHLORIDE, POTASSIUM CHLORIDE, SODIUM LACTATE AND CALCIUM CHLORIDE 125 ML/HR: 600; 310; 30; 20 INJECTION, SOLUTION INTRAVENOUS at 04:54

## 2024-08-12 NOTE — L&D DELIVERY NOTE
[facility]   Vaginal Delivery Note    Patient Name: Stephanie Quintanilla  : 1990  MRN: [id number]    Date of Delivery: 2024     Diagnosis     Pre & Post-Delivery:  Intrauterine pregnancy at 38w1d  Labor status: Spontaneous Onset of Labor     Vaginal bleeding             Problem List    Transfer to Postpartum     Review the Delivery Report for details.     Delivery     Delivery:      YOB: 2026    Time of Birth:  Gestational Age 6:06 AM   38w1d     Anesthesia: Epidural     Delivering clinician: Jeannette Tsai    Forceps?   No   Vacuum? No    Shoulder dystocia present: No        Delivery narrative:  Patient presented to L&D with vaginal spotting and admitted to L&D for term latent labor. Connected to external FHT and TOCO on presentation to unit. SVE on intake confirmed that she was 5-6 cm dilated. FHT demonstrated initial Category I findings. IV Pitocin per protocol initiated on low dose but subsequently discontinued due to initial fetal heart tracing deceleration that resolved. Fetal heart tracing returned to reactive. Bedside US with no placental abnormalities. Patient received epidural and was comfortable with contractions. Desired to progress naturally.     Labor progressed steadily. Patient felt increasingly more painful with contractions. Patient progressed to complete and felt the urge to push. Patient was placed in dorsal lithotomy position with feet in stirrups bilaterally. Patient was instructed on pushing in concert with contractions. Patient delivered infant vertex in ZANDRA position. Loose nuchal cord was present. Anterior left shoulder delivered by gentle downward traction then followed by right posterior shoulder by gentle upward traction without complication. No shoulder dystocia. Body followed without difficulty. Small amount of blood and clot delivered with infant. Male infant delivered pink, active, with good tone and was placed on maternal abdomen. Delayed cord  clamping was performed for 3 minute per patient request while infant was stimulated and vigorous cry noted. Umbilical cord was doubly clamped and cut by father of the baby. Infant was attended to by labor and delivery and nursery nurse. Arterial and venous cord blood gases collected. Umbilical cord blood collected. Placenta delivered intact spontaneously without complication via gentle umbilical cord traction. IV Pitocin was started. Fundal massage was initiated and fundus was found to be firm. Blood and clots were cleared from the vaginal vault. The perineum, vaginal walls, cervix, and paraurethral area were inspected thoroughly. No lacerations observed Hemostasis noted. No episiotomy was performed. Placenta was observed following delivery. Small area of edge of placenta with amount of dark clot indicating possible partial abruption. No missing. Cotyledons all intact and membranes complete. Thick 3 vessel cord confirmed. No complications. Mother and baby were stable  when leaving delivery room.     I was present for all portions of delivery as listed above.         Infant     Findings: male  infant     Infant observations: Weight: 3330 g (7 lb 5.5 oz)   Length: 19  in  Observations/Comments:        Apgars: 8  @ 1 minute /    9  @ 5 minutes   Infant Name: Nilesh     Placenta & Cord         Placenta delivered  Spontaneous  at   8/12/2024  6:13 AM     Cord:   present.   Nuchal Cord?  yes; Number of nuchal loops present:      Cord blood obtained:     Cord gases obtained:      Cord gas results: Venous:    pH, Cord Venous   Date Value Ref Range Status   08/12/2024 7.367 7.310 - 7.370 pH Units Final     Base Excess, Cord Venous   Date Value Ref Range Status   08/12/2024 -3.1 (L) 0.0 - 2.0 mmol/L Final       Arterial:    pH, Cord Arterial   Date Value Ref Range Status   08/12/2024 7.18 (C) 7.22 - 7.30 pH Units Final     Comment:     85 Value below critical limit     Base Exc, Cord Arterial   Date Value Ref Range Status    08/12/2024 -7.0 (L) 0.0 - 2.0 mmol/L Final        Repair     Episiotomy: Not recorded     No    Lacerations: No   Estimated Blood Loss:  100ml     Quantitative Blood Loss:          Complications     Possible partial placental abruption    Disposition     Mother to Mother Baby/Postpartum  in stable condition currently.  Baby to NBN  in stable condition currently.    Jeannette Tsai DO  08/12/24  06:31 EDT

## 2024-08-12 NOTE — PROGRESS NOTES
[facility]     Labor Progress Note    CC: Labor    IUP 38w1d, comfortable    Vitals:    08/12/24 0350   BP: 130/85   Pulse: 85   Resp:    Temp: 98 °F (36.7 °C)   SpO2:        Fetal Heart Rate Assessment           Baseline: Fetal HR Baseline: tachycardia   Varibility: Fetal HR Variability: moderate (amplitude range 6 to 25 bpm)   Accels: Fetal HR Accelerations: absent   Decels: Fetal HR Decelerations: variable, late   Tracing Category:       Uterine Assessment   Method: Method: external tocotransducer   Frequency (min): Contraction Frequency (Minutes): 2-4   Ctx Count in 10 min: Contractions in 10 Minutes:  (Monitor adjusted)   Intensity by IUPC:     Resting Tone by IUPC:       Cervix: Exam by: Method: sterile exam per physician   Dilation: Cervical Dilation (cm): 8   Effacement: Cervical Effacement: %   Station: Fetal Station: -1            Assessment: IUP 38w1d     Plan:   -Noted prolonged deceleration for approximately 3 minutes with prompt recovery to baseline  -SVE recheck 8//-1  -Small amount of spotting-bedside US by laborist revealed no placental abnormalities  -Niesha every 2-3 minutes  -Comfortable with epidural  -Offered AROM however patient declines at this time, wishes to proceed without further intervention at this time  -Discussed possible need for augmentation if fetal status change however FHT recovery to current Cat I  -Will continue to allow labor to progress  -No further questions      Jeannette Tsai DO  04:21 EDT  08/12/24

## 2024-08-12 NOTE — LACTATION NOTE
08/12/24 1410   Maternal Information   Date of Referral 08/12/24   Person Making Referral lactation consultant   Maternal Reason for Referral   (courtesy visit for new delivery. Hx: other child 3.5yrs however she could not BF d/t being in NICU per patient/FOB report.)   Maternal Assessment   Breast Size Issue none   Breast Shape Bilateral:;round   Breast Density Bilateral:;soft   Nipples Bilateral:;everted   Left Nipple Symptoms intact;nontender   Right Nipple Symptoms intact;nontender   Maternal Infant Feeding   Maternal Emotional State independent;receptive;relaxed   Infant Positioning cross-cradle  (Right)   Signs of Milk Transfer deep jaw excursions noted   Pain with Feeding no   Comfort Measures Before/During Feeding suction broken using finger;maternal position adjusted;latch adjusted;infant position adjusted   Latch Assistance full assistance needed  (Mother took over after demonstrating hold)   Support Person Involvement actively supporting mother   Milk Expression/Equipment   Breast Pump Type   (Pt has a Lansinoh hands free breast pump. Encouraged pt to pump for short or missed feedings or if formula is given.)

## 2024-08-12 NOTE — H&P
[facility]  Obstetric History and Physical    Chief Complaint   Patient presents with    Vaginal Bleeding     Pt endorses bleeding starting shortly after waking up around 0930 marble sized clots.       Subjective     Patient is a 34 y.o. female  currently at 38w0d, who presents for term labor  without ROM. On intake denies  contractions, denies  leakage of fluid, reports vaginal bleeding. States that bleeding began this morning and has been mostly light spotting. reports fetal movement.    Her prenatal care is complicated by history of oligohydramnios, history of COVID in T1, marginal placenta previa that was noted early in pregnancy but marked as resolved at 28 weeks, failed 1 hour GTT but passed 3hr GTT.  Her previous obstetric/gynecological history is noted for is remarkable for  x1 uncomplicated. She desires to progress somewhat naturally if possible.    The following portions of the patients history were reviewed and updated as appropriate: current medications, allergies, past medical history, past surgical history, past family history, past social history, and problem list .       Prenatal Information:  Prenatal Results       Initial Prenatal Labs       Test Value Reference Range Date Time    Hemoglobin  12.8 g/dL 12.0 - 15.9 24 1159    Hematocrit  38.3 % 34.0 - 46.6 24 1159    Platelets  264 10*3/mm3 140 - 450 24 1159    Rubella IgG  1.26 index Immune >0.99 24 1159    Hepatitis B SAg  Non-Reactive  Non-Reactive 24 1159    Hepatitis C Ab  Non-Reactive  Non-Reactive 24 0925    RPR  Non-Reactive  Non-Reactive 24 1159    T. Pallidum Ab   Non Reactive  Non Reactive 24 0925    ABO  A   24 1528    Rh  Positive   24 1528    Antibody Screen  Negative   24 1159    HIV  Non-Reactive  Non-Reactive 24 1159    Urine Culture  No growth   24 1159    Gonorrhea  Negative  Negative 24 1159    Chlamydia  Negative  Negative 24  1159    TSH  0.867 uIU/mL 0.270 - 4.200 01/24/24 1159    HgB A1c   5.40 % 4.80 - 5.60 01/24/24 1159    Varicella IgG        Hemoglobinopathy Fractionation        Hemoglobinopathy (genetic testing)        Cystic fibrosis                   Fetal testing        Test Value Reference Range Date Time    NIPT        MSAFP        AFP-4                  2nd and 3rd Trimester       Test Value Reference Range Date Time    Hemoglobin (repeated)  11.9 g/dL 12.0 - 15.9 08/11/24 1528       11.8 g/dL 12.0 - 15.9 07/17/24 1403       12.3 g/dL 12.0 - 15.9 06/05/24 0925    Hematocrit (repeated)  35.2 % 34.0 - 46.6 08/11/24 1528       34.7 % 34.0 - 46.6 07/17/24 1403       36.2 % 34.0 - 46.6 06/05/24 0925    Platelets   190 10*3/mm3 140 - 450 08/11/24 1528       195 10*3/mm3 140 - 450 07/17/24 1403       201 10*3/mm3 140 - 450 06/05/24 0925       264 10*3/mm3 140 - 450 01/24/24 1159    1 hour GTT   174 mg/dL 74 - 180 06/07/24 0915       161 mg/dL 65 - 139 06/05/24 0925    Antibody Screen (repeated)  Negative   08/11/24 1528       Negative   06/05/24 0925    3rd TM syphilis scrn (repeated)  RPR         3rd TM syphilis scrn (repeated) TP-Ab  Non Reactive  Non Reactive 06/05/24 0925    3rd TM syphilis screen TB-Ab (FTA)        Syphilis cascade test TP-Ab (EIA)        Syphilis cascade TPPA        GTT Fasting        GTT 1 Hr        GTT 2 Hr        GTT 3 Hr  132 mg/dL 74 - 140 06/07/24 1117    Group B Strep                  Other testing        Test Value Reference Range Date Time    Parvo IgG         CMV IgG                   Drug Screening       Test Value Reference Range Date Time    Amphetamine Screen  Negative  Negative 01/24/24 1159    Barbiturate Screen  Negative  Negative 01/24/24 1159    Benzodiazepine Screen  Negative  Negative 01/24/24 1159    Methadone Screen  Negative  Negative 01/24/24 1159    Phencyclidine Screen  Negative  Negative 01/24/24 1159    Opiates Screen  Negative  Negative 01/24/24 1159    THC Screen  Negative   Negative 24 1159    Cocaine Screen  Negative  Negative 24 1159    Propoxyphene Screen        Buprenorphine Screen  Negative  Negative 24 1159    Methamphetamine Screen  Negative  Negative 24 1159    Oxycodone Screen  Negative  Negative 24 1159    Tricyclic Antidepressants Screen  Negative  Negative 24 1159              Legend    ^: Historical                               Past OB History:       OB History    Para Term  AB Living   3 1 1 0 1 1   SAB IAB Ectopic Molar Multiple Live Births   1 0 0 0 0 1      # Outcome Date GA Lbr Artemio/2nd Weight Sex Type Anes PTL Lv   3 Current            2 SAB 23 8w0d    SAB      1 Term 21 40w1d 19:20 / 01:15 3040 g (6 lb 11.2 oz) M Vag-Spont EPI N JOSEPHINE      Name: LLUVIA DIAZ      Apgar1: 8  Apgar5: 9       Past Medical History: Past Medical History:   Diagnosis Date    Arthritis     knees    History of pneumonia as a child     IBS (irritable bowel syndrome)     gluten free diet    Pap smear for cervical cancer screening 10/2017    normal per pt report      Past Surgical History Past Surgical History:   Procedure Laterality Date    D & C WITH SUCTION N/A 2023    Procedure: SUCTION DILATATION AND CURETTAGE;  Surgeon: Radha Beckford MD;  Location: [facility];  Service: Obstetrics/Gynecology;  Laterality: N/A;    KNEE SURGERY Right ?    sports related trauma     WISDOM TOOTH EXTRACTION  ?      Family History: Family History   Problem Relation Age of Onset    Atrial fibrillation Mother         pacemaker    No Known Problems Father     Anxiety disorder Sister     Subarachnoid hemorrhage Maternal Grandmother         ANTICOAGULATION AND FALL    Heart attack Maternal Grandfather 65    Atrial fibrillation Maternal Grandfather     Lung cancer Paternal Grandfather     Atrial fibrillation Maternal Uncle     Heart disease Maternal Uncle     Breast cancer Neg Hx     Ovarian cancer Neg Hx       Social History:   reports that she has never smoked. She has never been exposed to tobacco smoke. She has never used smokeless tobacco.   reports that she does not currently use alcohol.   reports no history of drug use.        REVIEW OF SYSTEMS              Reports fetal movement is normal             Denies leakage of amniotic fluid.             Reports vaginal bleeding, describes as spotting             She reports No contractions             All other systems reviewed and are negative    Objective       Vital Signs Range for the last 24 hours  Temperature: Temp:  [98 °F (36.7 °C)] 98 °F (36.7 °C)   Temp Source: Temp src: Oral   BP: BP: (116-138)/(59-84) 116/61   Pulse: Heart Rate:  [57-81] 81   Respirations: Resp:  [18] 18   SPO2: SpO2:  [98 %] 98 %   O2 Amount (l/min):     O2 Devices     Weight: Weight:  [108 kg (237 lb)] 108 kg (237 lb)           Cervix: Exam by: Method: sterile exam per RN   Dilation: Cervical Dilation (cm): 5-6   Effacement: Cervical Effacement: 70%   Station: Fetal Station: -1        Fetal Heart Rate Assessment   Method: Fetal HR Assessment Method: external   Beats/min: Fetal HR (beats/min): 135   Baseline: Fetal HR Baseline: normal range   Varibility: Fetal HR Variability: moderate (amplitude range 6 to 25 bpm)   Accels: Fetal HR Accelerations: greater than/equal to 15 bpm, lasting at least 15 seconds   Decels: Fetal HR Decelerations: absent   Tracing Category:       Uterine Assessment   Method: Method: external tocotransducer   Frequency (min): Contraction Frequency (Minutes): Monitor adjusted, pt positioned after epidural   Ctx Count in 10 min: Contractions in 10 Minutes:  (Monitor adjusted)   Duration:     Intensity: Contraction Intensity: moderate by palpation   Intensity by IUPC:     Resting Tone: Uterine Resting Tone: soft by palpation   Resting Tone by IUPC:     Schwertner Units:         Constitutional:  Well developed, well nourished, no acute distress, well-groomed.   Respiratory:  Lungs are clear  to auscultation bilaterally, normal breath sounds.   Cardiovascular:  Normal rate and rhythm, no murmurs.   Gastrointestinal:  Soft, gravid, nontender.  Uterus: Soft, nontender. Fundus appropriate for dates.  Neurologic:  Alert & oriented x 3,  no focal deficits noted.   Psychiatric:  Speech and behavior appropriate.   Extremities: no cyanosis, clubbing or edema, no evidence of DVT.        Labs:  Lab Results   Component Value Date    WBC 9.59 2024    HGB 11.9 (L) 2024    HCT 35.2 2024    MCV 87.1 2024     2024    POCGLU 176 2024    CREATININE 0.84 2023    URICACID 3.9 2024    AST 17 2024    ALT 16 2024     2024     Results from last 7 days   Lab Units 24  1528   ABO TYPING  A   RH TYPING  Positive   ANTIBODY SCREEN  Negative           Assessment & Plan       Vaginal bleeding  Term Labor      Assessment:   IUP at 38w0d weeks gestation with reactive fetal status.    2.   term labor  without ROM, vaginal bleeding spotting indicative of cervical change  3.   Obstetrical history significant for is remarkable for  x1  4.   GBS status: Negative  5.   Rh: A+    Plan:  Oxytocin augmentation  Continuous FHT and TOCO monitoring.   Diet: NPO  Desires epidural for anesthesia. Anesthesia consulted and epidural placed  SVE mostly unchanged from intake 5-6/70/-1. IV Pitocin started and currently at 2u.   Discussed contractions remain intermittent and options for further augmentation including increased pitocin and AROM. Both offered to patient. Patient defers both at this time. Reviewed if fetal status becomes nonreassuring or limited cervical change need for augmentation to progression of labor. Patient would like to see how body does on low dose pitocin for now. Reviewed risk and benefit. Patient desires to proceed with plan. Will monitor closely overnight. Discussed can increase IV Pitocin or AROM when patient desires if fetal tolerance.    Plan of care has been reviewed with patient and questions answered.  Risks, benefits of treatment plan have been discussed.   Consent obtained to proceed with low does lidia Tsai DO  8/11/2024  20:05 EDT   Rhombic Flap Text: The defect edges were debeveled with a #15 scalpel blade. Given the location of the defect and the proximity to free margins a rhombic flap was deemed most appropriate. Using a sterile surgical marker, an appropriate rhombic flap was drawn incorporating the defect. The area thus outlined was incised deep to adipose tissue with a #15 scalpel blade. The skin margins were undermined to an appropriate distance in all directions utilizing iris scissors. Following this, the designed flap was carried over into the primary defect and sutured into place.

## 2024-08-13 PROBLEM — O02.1 MISSED ABORTION: Status: RESOLVED | Noted: 2023-05-22 | Resolved: 2024-08-13

## 2024-08-13 PROBLEM — N93.9 VAGINAL BLEEDING: Status: RESOLVED | Noted: 2024-08-11 | Resolved: 2024-08-13

## 2024-08-13 PROBLEM — Z98.890 S/P DILATION AND CURETTAGE: Status: RESOLVED | Noted: 2023-05-22 | Resolved: 2024-08-13

## 2024-08-13 LAB
BASOPHILS # BLD AUTO: 0.05 10*3/MM3 (ref 0–0.2)
BASOPHILS NFR BLD AUTO: 0.4 % (ref 0–1.5)
DEPRECATED RDW RBC AUTO: 49.4 FL (ref 37–54)
EOSINOPHIL # BLD AUTO: 0.04 10*3/MM3 (ref 0–0.4)
EOSINOPHIL NFR BLD AUTO: 0.3 % (ref 0.3–6.2)
ERYTHROCYTE [DISTWIDTH] IN BLOOD BY AUTOMATED COUNT: 15 % (ref 12.3–15.4)
HCT VFR BLD AUTO: 31.2 % (ref 34–46.6)
HGB BLD-MCNC: 10.3 G/DL (ref 12–15.9)
IMM GRANULOCYTES # BLD AUTO: 0.04 10*3/MM3 (ref 0–0.05)
IMM GRANULOCYTES NFR BLD AUTO: 0.3 % (ref 0–0.5)
LYMPHOCYTES # BLD AUTO: 2.22 10*3/MM3 (ref 0.7–3.1)
LYMPHOCYTES NFR BLD AUTO: 18.6 % (ref 19.6–45.3)
MCH RBC QN AUTO: 29.7 PG (ref 26.6–33)
MCHC RBC AUTO-ENTMCNC: 33 G/DL (ref 31.5–35.7)
MCV RBC AUTO: 89.9 FL (ref 79–97)
MONOCYTES # BLD AUTO: 0.72 10*3/MM3 (ref 0.1–0.9)
MONOCYTES NFR BLD AUTO: 6 % (ref 5–12)
NEUTROPHILS NFR BLD AUTO: 74.4 % (ref 42.7–76)
NEUTROPHILS NFR BLD AUTO: 8.84 10*3/MM3 (ref 1.7–7)
NRBC BLD AUTO-RTO: 0 /100 WBC (ref 0–0.2)
PLATELET # BLD AUTO: 152 10*3/MM3 (ref 140–450)
PMV BLD AUTO: 12.7 FL (ref 6–12)
RBC # BLD AUTO: 3.47 10*6/MM3 (ref 3.77–5.28)
WBC NRBC COR # BLD AUTO: 11.91 10*3/MM3 (ref 3.4–10.8)

## 2024-08-13 PROCEDURE — 85025 COMPLETE CBC W/AUTO DIFF WBC: CPT | Performed by: STUDENT IN AN ORGANIZED HEALTH CARE EDUCATION/TRAINING PROGRAM

## 2024-08-13 RX ORDER — FERROUS SULFATE 325(65) MG
325 TABLET ORAL
Status: DISCONTINUED | OUTPATIENT
Start: 2024-08-13 | End: 2024-08-14 | Stop reason: HOSPADM

## 2024-08-13 RX ADMIN — ACETAMINOPHEN 650 MG: 325 TABLET ORAL at 15:23

## 2024-08-13 RX ADMIN — FERROUS SULFATE TAB 325 MG (65 MG ELEMENTAL FE) 325 MG: 325 (65 FE) TAB at 10:43

## 2024-08-13 RX ADMIN — DOCUSATE SODIUM 100 MG: 100 CAPSULE, LIQUID FILLED ORAL at 08:28

## 2024-08-13 RX ADMIN — IBUPROFEN 600 MG: 600 TABLET, FILM COATED ORAL at 20:42

## 2024-08-13 RX ADMIN — IBUPROFEN 600 MG: 600 TABLET, FILM COATED ORAL at 10:43

## 2024-08-13 RX ADMIN — DOCUSATE SODIUM 100 MG: 100 CAPSULE, LIQUID FILLED ORAL at 20:42

## 2024-08-13 RX ADMIN — PRENATAL VITAMINS-IRON FUMARATE 27 MG IRON-FOLIC ACID 0.8 MG TABLET 1 TABLET: at 08:28

## 2024-08-13 NOTE — PROGRESS NOTES
8/13/2024  PPD #1    Subjective   Stephanie feels well.  Patient describes her lochia as less than menses.  Pain is well controlled       Objective   Temp: Temp:  [97.6 °F (36.4 °C)-98.4 °F (36.9 °C)] 97.6 °F (36.4 °C) Temp src: Oral   BP: BP: (104-125)/(60-77) 109/77        Pulse: Heart Rate:  [71-96] 77  RR: Resp:  [16] 16    General:  No acute distress   Abdomen: Fundus firm and beneath umbilicus   Pelvis: deferred     Lab Results   Component Value Date    WBC 11.91 (H) 08/13/2024    HGB 10.3 (L) 08/13/2024    HCT 31.2 (L) 08/13/2024    MCV 89.9 08/13/2024     08/13/2024    POCGLU 176 06/05/2024    CREATININE 0.84 08/18/2023    URICACID 3.9 07/17/2024    AST 17 07/17/2024    ALT 16 07/17/2024     07/17/2024    HEPBSAG Non-Reactive 01/24/2024     Results from last 7 days   Lab Units 08/11/24  1528   ABO TYPING  A   RH TYPING  Positive   ANTIBODY SCREEN  Negative       Assessment  PPD# 1 after vaginal delivery  Anemia POA    Plan  Supportive care, anticipate d/c in am.  Ferrous sulfate daily      This note has been electronically signed.    Fadia Gaytan CNM  09:18 EDT  August 13, 2024

## 2024-08-13 NOTE — ANESTHESIA POSTPROCEDURE EVALUATION
Patient: Stephanie Quintanilla    Procedure Summary       Date: 08/11/24 Room / Location:     Anesthesia Start: 1833 Anesthesia Stop: 08/12/24 0613    Procedure: LABOR ANALGESIA Diagnosis:     Scheduled Providers:  Provider: Reginaldo Ariza DO    Anesthesia Type: epidural ASA Status: 2            Anesthesia Type: epidural    Vitals  Vitals Value Taken Time   /77 08/13/24 0700   Temp 97.6 °F (36.4 °C) 08/13/24 0700   Pulse 77 08/13/24 0700   Resp 16 08/13/24 0700   SpO2             Post Anesthesia Care and Evaluation    Patient location during evaluation: bedside  Patient participation: complete - patient participated  Level of consciousness: awake and alert  Pain management: adequate    Airway patency: patent  Anesthetic complications: No anesthetic complications    Cardiovascular status: acceptable  Respiratory status: acceptable  Hydration status: acceptable  Post Neuraxial Block status: Motor and sensory function returned to baseline and No signs or symptoms of PDPH

## 2024-08-13 NOTE — LACTATION NOTE
08/12/24 2115   Maternal Information   Date of Referral 08/12/24   Person Making Referral patient  (lipstick shaped nipple after feed and tender)   Maternal Reason for Referral breast/nipple pain;other (see comments)  (lipstick shaped nipple after nursing)   Maternal Assessment   Breast Size Issue none   Breast Shape Bilateral:;round   Breast Density Bilateral:;soft   Areola Bilateral:;elastic   Nipples Bilateral:;everted   Left Nipple Symptoms tender   Right Nipple Symptoms tender   Maternal Infant Feeding   Maternal Emotional State relaxed;receptive   Infant Positioning clutch/football  (right)   Signs of Milk Transfer audible swallow;deep jaw excursions noted   Pain with Feeding yes   Pain Location nipple, right   Pain Description squeezing   Comfort Measures Before/During Feeding suction broken using finger;latch adjusted;infant position adjusted   Postfeeding Nipple Condition other (see comments)  (slightly pinched)   Nipple Shape After Feeding, Right pinched   Latch Assistance full assistance needed   Support Person Involvement verbally supports mother   Milk Expression/Equipment   Breast Pump Type double electric, personal  (Lansinoh)   Breast Pumping   Breast Pumping Interventions other (see comments)  (pump if becomes too painful to nurse)   Lactation Referrals   Lactation Referrals outpatient lactation program   Outpatient Lactation Program Lactation Follow-up Date/Time prn after discharge     F/U visit to newly postpartum couplet per pt. Request. She states her nipple is lipstick shaped after feeding and c/o tenderness. I demonstrated how to achieve deepest possible latch by getting baby breast level with a couple pillows, start nipple-to-nose then wait for wide gape and insert as much areola and nipple as far into baby's mouth as possible. Mom verbalized understanding and return demonstrated. Baby latches and starts sucking quickly. She has to be quick to get nipple as far into baby's mouth as she  can. It feels ok for a few minutes and then starts to get pinchy. I tried a chin challenge to widen latch and this seemed to help well. On oral assessment infant seems to have high palate, he is able to get tongue out past lower gumline at first and then starts biting/clinching and then will start sucking with tongue back out over gumline again. Mom states this seems to be what is happening when he is nursing as well. I offered to put in SLP. Mom kindly declines at this time and wants to see if adjusting the latch will help and will call out tomorrow if no improvement.

## 2024-08-13 NOTE — LACTATION NOTE
Mom said she has decided to formula feed only.  It was suggested that she could pump and supplement, but she is not interested at this time.  She was encouraged to call for assistance if she changes her mind.

## 2024-08-14 VITALS
WEIGHT: 237 LBS | RESPIRATION RATE: 16 BRPM | SYSTOLIC BLOOD PRESSURE: 118 MMHG | TEMPERATURE: 97.9 F | BODY MASS INDEX: 37.2 KG/M2 | DIASTOLIC BLOOD PRESSURE: 78 MMHG | OXYGEN SATURATION: 98 % | HEIGHT: 67 IN | HEART RATE: 80 BPM

## 2024-08-14 RX ORDER — IBUPROFEN 600 MG/1
600 TABLET ORAL EVERY 6 HOURS PRN
Qty: 60 TABLET | Refills: 1 | Status: SHIPPED | OUTPATIENT
Start: 2024-08-14

## 2024-08-14 RX ORDER — PSEUDOEPHEDRINE HCL 30 MG
100 TABLET ORAL 2 TIMES DAILY PRN
Qty: 60 CAPSULE | Refills: 1 | Status: SHIPPED | OUTPATIENT
Start: 2024-08-14

## 2024-08-14 RX ADMIN — PRENATAL VITAMINS-IRON FUMARATE 27 MG IRON-FOLIC ACID 0.8 MG TABLET 1 TABLET: at 09:50

## 2024-08-14 RX ADMIN — DOCUSATE SODIUM 100 MG: 100 CAPSULE, LIQUID FILLED ORAL at 09:50

## 2024-08-14 RX ADMIN — FERROUS SULFATE TAB 325 MG (65 MG ELEMENTAL FE) 325 MG: 325 (65 FE) TAB at 09:50

## 2024-08-14 RX ADMIN — IBUPROFEN 600 MG: 600 TABLET, FILM COATED ORAL at 11:50

## 2024-08-14 RX ADMIN — ACETAMINOPHEN 650 MG: 325 TABLET ORAL at 00:17

## 2024-08-14 NOTE — DISCHARGE SUMMARY
[facility]  Vaginal delivery discharge summary      Patient: Stephanie Quintanilla      MR#:[id number]  Admission  Diagnosis: Present on Admission:   (Resolved) Vaginal bleeding   Anemia of mother during pregnancy, delivered      Discharge Diagnosis: Active and Resolved Problems  Active Hospital Problems    Diagnosis  POA     (normal spontaneous vaginal delivery) [O80]  Not Applicable    Anemia of mother during pregnancy, delivered [O99.02]  Yes      Resolved Hospital Problems    Diagnosis Date Resolved POA    **Vaginal bleeding [N93.9] 2024 Yes            Date of Admission: 2024  Date of Discharge:  2024    Procedures:  Vaginal, Spontaneous     2024    6:06 AM      Service:  Obstetrics    Hospital Course:  Patient underwent vaginal delivery and remained in the hospital for 3 days.  During that time she remained afebrile and hemodynamically stable.  On the day of discharge, she was eating, ambulating and voiding without difficulty.  She is formula feeding.     Labs:    Lab Results   Component Value Date    WBC 11.91 (H) 2024    HGB 10.3 (L) 2024    HCT 31.2 (L) 2024    MCV 89.9 2024     2024    POCGLU 176 2024    CREATININE 0.84 2023    URICACID 3.9 2024    AST 17 2024    ALT 16 2024     2024     Results from last 7 days   Lab Units 24  1528   ABO TYPING  A   RH TYPING  Positive   ANTIBODY SCREEN  Negative            Discharge Medications        New Medications        Instructions Start Date   docusate sodium 100 MG capsule   100 mg, Oral, 2 Times Daily PRN      ibuprofen 600 MG tablet  Commonly known as: ADVIL,MOTRIN   600 mg, Oral, Every 6 Hours PRN             Continue These Medications        Instructions Start Date   multivitamin tablet tablet   1 tablet, Oral, Daily             Stop These Medications      calcium carbonate 500 MG chewable tablet  Commonly known as: TUMS              Discharge  Disposition:  To Home    Discharge Condition:  Stable    Discharge Diet: Regular    Activity at Discharge: Pelvic rest    Follow-up Appointments  Follow up with [facility] in 6 weeks.     Lindsey Greco CNM  08/14/24  12:23 EDT

## 2024-08-15 LAB
CYTO UR: NORMAL
LAB AP CASE REPORT: NORMAL
LAB AP CLINICAL INFORMATION: NORMAL
LAB AP DIAGNOSIS COMMENT: NORMAL
PATH REPORT.FINAL DX SPEC: NORMAL
PATH REPORT.GROSS SPEC: NORMAL

## 2024-08-15 NOTE — PAYOR COMM NOTE
"Jovani Quintanillaming (34 y.o. Female)     From:Huyen Waters LPN, Utilization Review  Phone #[phone]  Fax #[phone]      Discharged 24 with Baby.          Date of Birth   1990    Social Security Number       Address   [address] [place] [address]    Home Phone   [phone]    MRN   [id number]       Muslim   Bahai    Marital Status                               Admission Date   24    Admission Type   Elective    Admitting Provider   Jeannette Tsai DO    Attending Provider       Department, Room/Bed   [facility] MOTHER BABY 4B, N431/1       Discharge Date   2024    Discharge Disposition   Home or Self Care    Discharge Destination                                 Attending Provider: (none)   Allergies: Codeine, Bio-35 Gluten-free [Actical]    Isolation: None   Infection: None   Code Status: Prior    Ht: 170.2 cm (67\")   Wt: 108 kg (237 lb)    Admission Cmt: None   Principal Problem: Vaginal bleeding [N93.9]                   Active Insurance as of 2024       Primary Coverage       Payor Plan Insurance Group Employer/Plan Group    [facility] [id number]       Payor Plan Address Payor Plan Phone Number Payor Plan Fax Number Effective Dates    [address]   2024 - None Entered    [facility] [id number]         Subscriber Name Subscriber Birth Date Member ID       JOVANI QUINTANILLA 1990 [id number]                     Emergency Contacts        (Rel.) Home Phone Work Phone Mobile Phone    SocorroHa (Spouse) -- -- [phone]    SOCORROSTACIA (Mother) -- -- [phone]                 Operative/Procedure Notes (last 7 days)        Jeannette Tsai DO at 24 0631           [facility]   Vaginal Delivery Note    Patient Name: Jovani Quintanilla  : 1990  MRN: [id number]    Date of Delivery: 2024     Diagnosis     Pre & Post-Delivery:  Intrauterine " pregnancy at 38w1d  Labor status: Spontaneous Onset of Labor     Vaginal bleeding             Problem List    Transfer to Postpartum     Review the Delivery Report for details.     Delivery     Delivery:      YOB: 2026    Time of Birth:  Gestational Age 6:06 AM   38w1d     Anesthesia: Epidural     Delivering clinician: Jeannette Tsai    Forceps?   No   Vacuum? No    Shoulder dystocia present: No        Delivery narrative:  Patient presented to L&D with vaginal spotting and admitted to L&D for term latent labor. Connected to external FHT and TOCO on presentation to unit. SVE on intake confirmed that she was 5-6 cm dilated. FHT demonstrated initial Category I findings. IV Pitocin per protocol initiated on low dose but subsequently discontinued due to initial fetal heart tracing deceleration that resolved. Fetal heart tracing returned to reactive. Bedside US with no placental abnormalities. Patient received epidural and was comfortable with contractions. Desired to progress naturally.     Labor progressed steadily. Patient felt increasingly more painful with contractions. Patient progressed to complete and felt the urge to push. Patient was placed in dorsal lithotomy position with feet in stirrups bilaterally. Patient was instructed on pushing in concert with contractions. Patient delivered infant vertex in ZANDRA position. Loose nuchal cord was present. Anterior left shoulder delivered by gentle downward traction then followed by right posterior shoulder by gentle upward traction without complication. No shoulder dystocia. Body followed without difficulty. Small amount of blood and clot delivered with infant. Male infant delivered pink, active, with good tone and was placed on maternal abdomen. Delayed cord clamping was performed for 3 minute per patient request while infant was stimulated and vigorous cry noted. Umbilical cord was doubly clamped and cut by father of the baby. Infant was attended to  by labor and delivery and nursery nurse. Arterial and venous cord blood gases collected. Umbilical cord blood collected. Placenta delivered intact spontaneously without complication via gentle umbilical cord traction. IV Pitocin was started. Fundal massage was initiated and fundus was found to be firm. Blood and clots were cleared from the vaginal vault. The perineum, vaginal walls, cervix, and paraurethral area were inspected thoroughly. No lacerations observed Hemostasis noted. No episiotomy was performed. Placenta was observed following delivery. Small area of edge of placenta with amount of dark clot indicating possible partial abruption. No missing. Cotyledons all intact and membranes complete. Thick 3 vessel cord confirmed. No complications. Mother and baby were stable  when leaving delivery room.     I was present for all portions of delivery as listed above.         Infant     Findings: male  infant     Infant observations: Weight: 3330 g (7 lb 5.5 oz)   Length: 19  in  Observations/Comments:        Apgars: 8  @ 1 minute /    9  @ 5 minutes   Infant Name: Nilesh     Placenta & Cord         Placenta delivered  Spontaneous  at   8/12/2024  6:13 AM     Cord:   present.   Nuchal Cord?  yes; Number of nuchal loops present:      Cord blood obtained:     Cord gases obtained:      Cord gas results: Venous:    pH, Cord Venous   Date Value Ref Range Status   08/12/2024 7.367 7.310 - 7.370 pH Units Final     Base Excess, Cord Venous   Date Value Ref Range Status   08/12/2024 -3.1 (L) 0.0 - 2.0 mmol/L Final       Arterial:    pH, Cord Arterial   Date Value Ref Range Status   08/12/2024 7.18 (C) 7.22 - 7.30 pH Units Final     Comment:     85 Value below critical limit     Base Exc, Cord Arterial   Date Value Ref Range Status   08/12/2024 -7.0 (L) 0.0 - 2.0 mmol/L Final        Repair     Episiotomy: Not recorded     No    Lacerations: No   Estimated Blood Loss:  100ml     Quantitative Blood Loss:          Complications      Possible partial placental abruption    Disposition     Mother to Mother Baby/Postpartum  in stable condition currently.  Baby to NBN  in stable condition currently.    Jeannette Tsai DO  24  06:31 EDT          Electronically signed by Jeannette Tsai DO at 24 0636       Juana Angulo, RN at 24 0658          Delivery Team RN notified per phone of cord gas results.    Electronically signed by Juana Angulo RN at 24 0659          Discharge Summary        Lindsey Greco CNM at 24 1223       Attestation signed by Stephanie Daly MD at 24 1245    I have reviewed this documentation and agree.                  [facility]  Vaginal delivery discharge summary      Patient: Stephanie Quintanilla      MR#:[id number]  Admission  Diagnosis: Present on Admission:   (Resolved) Vaginal bleeding   Anemia of mother during pregnancy, delivered      Discharge Diagnosis: Active and Resolved Problems  Active Hospital Problems    Diagnosis  POA     (normal spontaneous vaginal delivery) [O80]  Not Applicable    Anemia of mother during pregnancy, delivered [O99.02]  Yes      Resolved Hospital Problems    Diagnosis Date Resolved POA    **Vaginal bleeding [N93.9] 2024 Yes            Date of Admission: 2024  Date of Discharge:  2024    Procedures:  Vaginal, Spontaneous     2024    6:06 AM      Service:  Obstetrics    Hospital Course:  Patient underwent vaginal delivery and remained in the hospital for 3 days.  During that time she remained afebrile and hemodynamically stable.  On the day of discharge, she was eating, ambulating and voiding without difficulty.  She is formula feeding.     Labs:    Lab Results   Component Value Date    WBC 11.91 (H) 2024    HGB 10.3 (L) 2024    HCT 31.2 (L) 2024    MCV 89.9 2024     2024    POCGLU 176 2024    CREATININE 0.84 2023    URICACID 3.9 2024    AST 17 2024     ALT 16 07/17/2024     07/17/2024     Results from last 7 days   Lab Units 08/11/24  1528   ABO TYPING  A   RH TYPING  Positive   ANTIBODY SCREEN  Negative            Discharge Medications        New Medications        Instructions Start Date   docusate sodium 100 MG capsule   100 mg, Oral, 2 Times Daily PRN      ibuprofen 600 MG tablet  Commonly known as: ADVIL,MOTRIN   600 mg, Oral, Every 6 Hours PRN             Continue These Medications        Instructions Start Date   multivitamin tablet tablet   1 tablet, Oral, Daily             Stop These Medications      calcium carbonate 500 MG chewable tablet  Commonly known as: TUMS              Discharge Disposition:  To Home    Discharge Condition:  Stable    Discharge Diet: Regular    Activity at Discharge: Pelvic rest    Follow-up Appointments  Follow up with [facility] in 6 weeks.     Lindsey Greco CNM  08/14/24  12:23 EDT    Electronically signed by Stephanie Daly MD at 08/14/24 2081

## 2024-08-23 ENCOUNTER — MATERNAL SCREENING (OUTPATIENT)
Dept: CALL CENTER | Facility: HOSPITAL | Age: 34
End: 2024-08-23
Payer: COMMERCIAL

## 2024-08-23 NOTE — OUTREACH NOTE
Maternal Screening Survey      Flowsheet Row Responses   Facility patient discharged from? [facility]   Attempt successful? No   Unsuccessful attempts Attempt 1              Lorena VILLAFANA - Registered Nurse

## 2024-08-23 NOTE — OUTREACH NOTE
Maternal Screening Survey      Flowsheet Row Responses   Facility patient discharged from? [facility]   Attempt successful? Yes   Call start time 1313   Call end time 1318   EPD Scale: Able to Laugh 0-->as much as she always could   EPD Scale: Looked Forward 0-->as much as she ever did   EPD Scale: Blamed Self 0-->no, never   EPD Scale: Been Anxious 1-->hardly ever   EPD Scale: Felt Panicky 0-->no, not at all   EPD Scale: Things Getting on Top 0-->no, has been coping as well as ever   EPD Scale: Difficulty Sleeping 0-->no, not at all   EPD Scale: Sad or Miserable 0-->no, not at all   EPD Scale: Crying 0-->no, never   EPD Scale: Thought of Harming Self 0-->never   [place]  Depression Score 1   Did any of your parents have problems with alcohol or drug use? No   Do any of your peers have problems with alcohol or drug use? No   Does your partner have problems with alcohol or drug use? No   Before you were pregnant did you have problems with alcohol or drug use? (past) No   In the past month, did you drink beer, wine, liquor or use any other drugs? (pregnancy) No   Maternal Screening call completed Yes   Call end time 1318              Lorena VILLAFANA - Registered Nurse

## (undated) DEVICE — VACURETTE CRV RIGD 8MM DISP

## (undated) DEVICE — SCRB SURG BACTOSHIELD CHG 4PCT 4OZ

## (undated) DEVICE — TRAP TISS EA/10

## (undated) DEVICE — GLV SURG SENSICARE PI MIC PF SZ6.5 LF STRL

## (undated) DEVICE — CANSTR SXN UTER NO FLTR SURG

## (undated) DEVICE — TBG W FLTR FOR BERKELEY/SYS EA/10

## (undated) DEVICE — LEX D AND C: Brand: MEDLINE INDUSTRIES, INC.

## (undated) DEVICE — ST COL BERKELY TBG